# Patient Record
Sex: FEMALE | Race: WHITE | NOT HISPANIC OR LATINO | Employment: FULL TIME | URBAN - METROPOLITAN AREA
[De-identification: names, ages, dates, MRNs, and addresses within clinical notes are randomized per-mention and may not be internally consistent; named-entity substitution may affect disease eponyms.]

---
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
## 2022-05-05 PROCEDURE — U0004 COV-19 TEST NON-CDC HGH THRU: HCPCS | Performed by: FAMILY MEDICINE

## 2022-06-18 ENCOUNTER — APPOINTMENT (OUTPATIENT)
Dept: CT IMAGING | Facility: HOSPITAL | Age: 22
End: 2022-06-18

## 2022-06-18 ENCOUNTER — HOSPITAL ENCOUNTER (EMERGENCY)
Facility: HOSPITAL | Age: 22
Discharge: HOME | End: 2022-06-18
Attending: EMERGENCY MEDICINE | Admitting: EMERGENCY MEDICINE

## 2022-06-18 ENCOUNTER — HOSPITAL ENCOUNTER (EMERGENCY)
Facility: HOSPITAL | Age: 22
Discharge: LEFT BEFORE DISCHARGE | End: 2022-06-18

## 2022-06-18 VITALS
SYSTOLIC BLOOD PRESSURE: 113 MMHG | DIASTOLIC BLOOD PRESSURE: 72 MMHG | OXYGEN SATURATION: 100 % | RESPIRATION RATE: 18 BRPM | WEIGHT: 162.7 LBS | HEART RATE: 62 BPM | BODY MASS INDEX: 26.15 KG/M2 | TEMPERATURE: 97.7 F | HEIGHT: 66 IN

## 2022-06-18 VITALS
SYSTOLIC BLOOD PRESSURE: 123 MMHG | WEIGHT: 163.58 LBS | DIASTOLIC BLOOD PRESSURE: 84 MMHG | HEART RATE: 65 BPM | OXYGEN SATURATION: 100 % | HEIGHT: 66 IN | RESPIRATION RATE: 18 BRPM | BODY MASS INDEX: 26.29 KG/M2 | TEMPERATURE: 98 F

## 2022-06-18 DIAGNOSIS — R10.84 GENERALIZED ABDOMINAL PAIN: Primary | ICD-10-CM

## 2022-06-18 DIAGNOSIS — N30.00 ACUTE CYSTITIS WITHOUT HEMATURIA: ICD-10-CM

## 2022-06-18 LAB
ALBUMIN SERPL-MCNC: 4.8 G/DL (ref 3.5–5.2)
ALBUMIN/GLOB SERPL: 2 G/DL
ALP SERPL-CCNC: 56 U/L (ref 39–117)
ALT SERPL W P-5'-P-CCNC: 14 U/L (ref 1–33)
ANION GAP SERPL CALCULATED.3IONS-SCNC: 13 MMOL/L (ref 5–15)
AST SERPL-CCNC: 16 U/L (ref 1–32)
BACTERIA UR QL AUTO: ABNORMAL /HPF
BASOPHILS # BLD AUTO: 0.03 10*3/MM3 (ref 0–0.2)
BASOPHILS NFR BLD AUTO: 0.6 % (ref 0–1.5)
BILIRUB SERPL-MCNC: 0.4 MG/DL (ref 0–1.2)
BILIRUB UR QL STRIP: NEGATIVE
BUN SERPL-MCNC: 13 MG/DL (ref 6–20)
BUN/CREAT SERPL: 20.3 (ref 7–25)
CALCIUM SPEC-SCNC: 10 MG/DL (ref 8.6–10.5)
CHLORIDE SERPL-SCNC: 104 MMOL/L (ref 98–107)
CLARITY UR: ABNORMAL
CO2 SERPL-SCNC: 21 MMOL/L (ref 22–29)
COLOR UR: ABNORMAL
CREAT SERPL-MCNC: 0.64 MG/DL (ref 0.57–1)
DEPRECATED RDW RBC AUTO: 41.1 FL (ref 37–54)
EGFRCR SERPLBLD CKD-EPI 2021: 129.9 ML/MIN/1.73
EOSINOPHIL # BLD AUTO: 0.23 10*3/MM3 (ref 0–0.4)
EOSINOPHIL NFR BLD AUTO: 4.3 % (ref 0.3–6.2)
ERYTHROCYTE [DISTWIDTH] IN BLOOD BY AUTOMATED COUNT: 12.4 % (ref 12.3–15.4)
GLOBULIN UR ELPH-MCNC: 2.4 GM/DL
GLUCOSE SERPL-MCNC: 110 MG/DL (ref 65–99)
GLUCOSE UR STRIP-MCNC: NEGATIVE MG/DL
HCG INTACT+B SERPL-ACNC: <0.5 MIU/ML
HCT VFR BLD AUTO: 39.7 % (ref 34–46.6)
HGB BLD-MCNC: 13.4 G/DL (ref 12–15.9)
HGB UR QL STRIP.AUTO: NEGATIVE
HOLD SPECIMEN: NORMAL
HOLD SPECIMEN: NORMAL
HYALINE CASTS UR QL AUTO: ABNORMAL /LPF
IMM GRANULOCYTES # BLD AUTO: 0.01 10*3/MM3 (ref 0–0.05)
IMM GRANULOCYTES NFR BLD AUTO: 0.2 % (ref 0–0.5)
KETONES UR QL STRIP: ABNORMAL
LEUKOCYTE ESTERASE UR QL STRIP.AUTO: ABNORMAL
LIPASE SERPL-CCNC: 18 U/L (ref 13–60)
LYMPHOCYTES # BLD AUTO: 1.11 10*3/MM3 (ref 0.7–3.1)
LYMPHOCYTES NFR BLD AUTO: 20.5 % (ref 19.6–45.3)
MCH RBC QN AUTO: 30.2 PG (ref 26.6–33)
MCHC RBC AUTO-ENTMCNC: 33.8 G/DL (ref 31.5–35.7)
MCV RBC AUTO: 89.4 FL (ref 79–97)
MONOCYTES # BLD AUTO: 0.45 10*3/MM3 (ref 0.1–0.9)
MONOCYTES NFR BLD AUTO: 8.3 % (ref 5–12)
NEUTROPHILS NFR BLD AUTO: 3.58 10*3/MM3 (ref 1.7–7)
NEUTROPHILS NFR BLD AUTO: 66.1 % (ref 42.7–76)
NITRITE UR QL STRIP: NEGATIVE
NRBC BLD AUTO-RTO: 0 /100 WBC (ref 0–0.2)
PH UR STRIP.AUTO: 6.5 [PH] (ref 5–8)
PLATELET # BLD AUTO: 199 10*3/MM3 (ref 140–450)
PMV BLD AUTO: 10.3 FL (ref 6–12)
POTASSIUM SERPL-SCNC: 3.9 MMOL/L (ref 3.5–5.2)
PROT SERPL-MCNC: 7.2 G/DL (ref 6–8.5)
PROT UR QL STRIP: ABNORMAL
RBC # BLD AUTO: 4.44 10*6/MM3 (ref 3.77–5.28)
RBC # UR STRIP: ABNORMAL /HPF
REF LAB TEST METHOD: ABNORMAL
SODIUM SERPL-SCNC: 138 MMOL/L (ref 136–145)
SP GR UR STRIP: >1.03 (ref 1–1.03)
SQUAMOUS #/AREA URNS HPF: ABNORMAL /HPF
UROBILINOGEN UR QL STRIP: ABNORMAL
WBC # UR STRIP: ABNORMAL /HPF
WBC NRBC COR # BLD: 5.41 10*3/MM3 (ref 3.4–10.8)
WHOLE BLOOD HOLD COAG: NORMAL
WHOLE BLOOD HOLD SPECIMEN: NORMAL

## 2022-06-18 PROCEDURE — 25010000002 ONDANSETRON PER 1 MG: Performed by: NURSE PRACTITIONER

## 2022-06-18 PROCEDURE — 85025 COMPLETE CBC W/AUTO DIFF WBC: CPT | Performed by: EMERGENCY MEDICINE

## 2022-06-18 PROCEDURE — 99283 EMERGENCY DEPT VISIT LOW MDM: CPT

## 2022-06-18 PROCEDURE — 80053 COMPREHEN METABOLIC PANEL: CPT | Performed by: EMERGENCY MEDICINE

## 2022-06-18 PROCEDURE — 25010000002 KETOROLAC TROMETHAMINE PER 15 MG: Performed by: NURSE PRACTITIONER

## 2022-06-18 PROCEDURE — 99211 OFF/OP EST MAY X REQ PHY/QHP: CPT

## 2022-06-18 PROCEDURE — 96375 TX/PRO/DX INJ NEW DRUG ADDON: CPT

## 2022-06-18 PROCEDURE — 83690 ASSAY OF LIPASE: CPT | Performed by: EMERGENCY MEDICINE

## 2022-06-18 PROCEDURE — 25010000002 CEFTRIAXONE PER 250 MG: Performed by: NURSE PRACTITIONER

## 2022-06-18 PROCEDURE — 96361 HYDRATE IV INFUSION ADD-ON: CPT

## 2022-06-18 PROCEDURE — 96374 THER/PROPH/DIAG INJ IV PUSH: CPT

## 2022-06-18 PROCEDURE — 74176 CT ABD & PELVIS W/O CONTRAST: CPT

## 2022-06-18 PROCEDURE — 84702 CHORIONIC GONADOTROPIN TEST: CPT | Performed by: EMERGENCY MEDICINE

## 2022-06-18 PROCEDURE — 81001 URINALYSIS AUTO W/SCOPE: CPT | Performed by: EMERGENCY MEDICINE

## 2022-06-18 RX ORDER — ONDANSETRON 4 MG/1
4 TABLET, ORALLY DISINTEGRATING ORAL EVERY 8 HOURS PRN
Qty: 10 TABLET | Refills: 0 | Status: SHIPPED | OUTPATIENT
Start: 2022-06-18 | End: 2022-12-01 | Stop reason: SDUPTHER

## 2022-06-18 RX ORDER — ONDANSETRON 2 MG/ML
4 INJECTION INTRAMUSCULAR; INTRAVENOUS ONCE
Status: COMPLETED | OUTPATIENT
Start: 2022-06-18 | End: 2022-06-18

## 2022-06-18 RX ORDER — SODIUM CHLORIDE 0.9 % (FLUSH) 0.9 %
10 SYRINGE (ML) INJECTION AS NEEDED
Status: DISCONTINUED | OUTPATIENT
Start: 2022-06-18 | End: 2022-06-18 | Stop reason: HOSPADM

## 2022-06-18 RX ORDER — CEFTRIAXONE SODIUM 1 G/50ML
1 INJECTION, SOLUTION INTRAVENOUS ONCE
Status: COMPLETED | OUTPATIENT
Start: 2022-06-18 | End: 2022-06-18

## 2022-06-18 RX ORDER — DICYCLOMINE HCL 20 MG
20 TABLET ORAL EVERY 6 HOURS
Qty: 20 TABLET | Refills: 0 | Status: SHIPPED | OUTPATIENT
Start: 2022-06-18 | End: 2022-12-01

## 2022-06-18 RX ORDER — KETOROLAC TROMETHAMINE 30 MG/ML
30 INJECTION, SOLUTION INTRAMUSCULAR; INTRAVENOUS ONCE
Status: COMPLETED | OUTPATIENT
Start: 2022-06-18 | End: 2022-06-18

## 2022-06-18 RX ORDER — CEFDINIR 300 MG/1
300 CAPSULE ORAL 2 TIMES DAILY
Qty: 14 CAPSULE | Refills: 0 | Status: SHIPPED | OUTPATIENT
Start: 2022-06-18 | End: 2022-06-25

## 2022-06-18 RX ADMIN — KETOROLAC TROMETHAMINE 30 MG: 30 INJECTION, SOLUTION INTRAMUSCULAR; INTRAVENOUS at 05:32

## 2022-06-18 RX ADMIN — SODIUM CHLORIDE 1000 ML: 9 INJECTION, SOLUTION INTRAVENOUS at 05:30

## 2022-06-18 RX ADMIN — ONDANSETRON 4 MG: 2 INJECTION INTRAMUSCULAR; INTRAVENOUS at 05:30

## 2022-06-18 RX ADMIN — CEFTRIAXONE SODIUM 1 G: 1 INJECTION, SOLUTION INTRAVENOUS at 06:56

## 2022-06-18 NOTE — DISCHARGE INSTRUCTIONS
Ct scan was negative for any acute findings    Lab work was unremarkable other than urine showed some signs of infection.    Take medications as prescribed for treatment of UTI and for symptomatic treatment of abdominal pain    Follow-up with PCP as needed if no better.    Return for new or worsening symptoms

## 2022-06-18 NOTE — ED PROVIDER NOTES
Time: 06:54 EDT  Arrived by: EMS  Chief Complaint: Abdominal pain  History provided by: Patient  History is limited by: N/A    History of Present Illness:  Patient is a 22 y.o. year old female that presents to the emergency department with abdominal pain      History provided by:  Patient  Abdominal Pain  Pain location:  LUQ  Pain quality: aching and cramping    Pain radiates to:  LLQ and periumbilical region  Pain severity:  Moderate  Onset quality:  Sudden  Duration:  9 hours  Timing:  Constant  Progression:  Waxing and waning  Chronicity:  New  Context comment:  Patient complaining of left-sided abdominal pain that started suddenly around 8:30 PM  Relieved by:  Nothing  Worsened by:  Movement and palpation  Ineffective treatments:  NSAIDs  Associated symptoms: nausea and vomiting    Associated symptoms: no anorexia, no belching, no chest pain, no chills, no constipation, no cough, no diarrhea, no dysuria, no fatigue, no fever, no flatus, no hematemesis, no hematochezia, no hematuria, no melena, no shortness of breath, no sore throat, no vaginal bleeding and no vaginal discharge    Vomiting  The primary symptoms include abdominal pain, nausea and vomiting. Primary symptoms do not include fever, fatigue, diarrhea, melena, hematemesis, hematochezia or dysuria.   The illness is also significant for back pain. The illness does not include chills, anorexia or constipation.   Nausea  The primary symptoms include abdominal pain, nausea and vomiting. Primary symptoms do not include fever, fatigue, diarrhea, melena, hematemesis, hematochezia or dysuria.   The illness is also significant for back pain. The illness does not include chills, anorexia or constipation.       Similar Symptoms Previously: No  Recently seen: No      Patient Care Team  Primary Care Provider: None    Past Medical History:     Allergies   Allergen Reactions   • Contrast Dye Anaphylaxis     Past Medical History:   Diagnosis Date   • Cancer (HCC)    •  "Disease of thyroid gland      Past Surgical History:   Procedure Laterality Date   • APPENDECTOMY     • CHOLECYSTECTOMY     • TUMOR REMOVAL       History reviewed. No pertinent family history.    Home Medications:  Prior to Admission medications    Not on File        Social History:   PT  reports that she has been smoking cigarettes. She has never used smokeless tobacco. She reports current alcohol use. She reports previous drug use.    Record Review:  I have reviewed the patient's records in [facility].     Review of Systems  Review of Systems   Constitutional: Negative for chills, fatigue and fever.   HENT: Negative.  Negative for sore throat.    Eyes: Negative.    Respiratory: Negative.  Negative for cough and shortness of breath.    Cardiovascular: Negative.  Negative for chest pain.   Gastrointestinal: Positive for abdominal pain, nausea and vomiting. Negative for anorexia, constipation, diarrhea, flatus, hematemesis, hematochezia and melena.   Genitourinary: Positive for flank pain. Negative for dysuria, hematuria, vaginal bleeding and vaginal discharge.   Musculoskeletal: Positive for back pain.   Skin: Negative.    Neurological: Negative.    Hematological: Negative.    Psychiatric/Behavioral: Negative.         Physical Exam  /84   Pulse 51   Temp 98 °F (36.7 °C) (Oral)   Resp 18   Ht 167.6 cm (66\")   Wt 74.2 kg (163 lb 9.3 oz)   LMP 06/13/2022   SpO2 98%   BMI 26.40 kg/m²     Physical Exam  Vitals and nursing note reviewed.   Constitutional:       General: She is not in acute distress.     Appearance: Normal appearance. She is not toxic-appearing.   HENT:      Head: Normocephalic and atraumatic.      Mouth/Throat:      Mouth: Mucous membranes are moist.   Eyes:      General: No scleral icterus.  Cardiovascular:      Rate and Rhythm: Normal rate and regular rhythm.      Pulses: Normal pulses.      Heart sounds: Normal heart sounds.   Pulmonary:      Effort: Pulmonary effort is normal. No respiratory " "distress.      Breath sounds: Normal breath sounds.   Abdominal:      General: Abdomen is flat.      Palpations: Abdomen is soft.      Tenderness: There is abdominal tenderness in the periumbilical area, left upper quadrant and left lower quadrant. There is left CVA tenderness. There is no right CVA tenderness.      Hernia: No hernia is present.   Musculoskeletal:         General: Normal range of motion.      Cervical back: Normal range of motion and neck supple.   Skin:     General: Skin is warm and dry.   Neurological:      Mental Status: She is alert and oriented to person, place, and time. Mental status is at baseline.   Psychiatric:         Mood and Affect: Mood normal.         Behavior: Behavior normal.                  ED Course  /84   Pulse 51   Temp 98 °F (36.7 °C) (Oral)   Resp 18   Ht 167.6 cm (66\")   Wt 74.2 kg (163 lb 9.3 oz)   LMP 06/13/2022   SpO2 98%   BMI 26.40 kg/m²   Results for orders placed or performed during the hospital encounter of 06/18/22   Comprehensive Metabolic Panel    Specimen: Blood   Result Value Ref Range    Glucose 110 (H) 65 - 99 mg/dL    BUN 13 6 - 20 mg/dL    Creatinine 0.64 0.57 - 1.00 mg/dL    Sodium 138 136 - 145 mmol/L    Potassium 3.9 3.5 - 5.2 mmol/L    Chloride 104 98 - 107 mmol/L    CO2 21.0 (L) 22.0 - 29.0 mmol/L    Calcium 10.0 8.6 - 10.5 mg/dL    Total Protein 7.2 6.0 - 8.5 g/dL    Albumin 4.80 3.50 - 5.20 g/dL    ALT (SGPT) 14 1 - 33 U/L    AST (SGOT) 16 1 - 32 U/L    Alkaline Phosphatase 56 39 - 117 U/L    Total Bilirubin 0.4 0.0 - 1.2 mg/dL    Globulin 2.4 gm/dL    A/G Ratio 2.0 g/dL    BUN/Creatinine Ratio 20.3 7.0 - 25.0    Anion Gap 13.0 5.0 - 15.0 mmol/L    eGFR 129.9 >60.0 mL/min/1.73   Lipase    Specimen: Blood   Result Value Ref Range    Lipase 18 13 - 60 U/L   Urinalysis With Microscopic If Indicated (No Culture) - Urine, Clean Catch    Specimen: Urine, Clean Catch   Result Value Ref Range    Color, UA Dark Yellow (A) Yellow, Straw    " Appearance, UA Cloudy (A) Clear    pH, UA 6.5 5.0 - 8.0    Specific Gravity, UA >1.030 (H) 1.005 - 1.030    Glucose, UA Negative Negative    Ketones, UA 15 mg/dL (1+) (A) Negative    Bilirubin, UA Negative Negative    Blood, UA Negative Negative    Protein, UA Trace (A) Negative    Leuk Esterase, UA Trace (A) Negative    Nitrite, UA Negative Negative    Urobilinogen, UA 1.0 E.U./dL 0.2 - 1.0 E.U./dL   hCG, Quantitative, Pregnancy    Specimen: Blood   Result Value Ref Range    HCG Quantitative <0.50 mIU/mL   CBC Auto Differential    Specimen: Blood   Result Value Ref Range    WBC 5.41 3.40 - 10.80 10*3/mm3    RBC 4.44 3.77 - 5.28 10*6/mm3    Hemoglobin 13.4 12.0 - 15.9 g/dL    Hematocrit 39.7 34.0 - 46.6 %    MCV 89.4 79.0 - 97.0 fL    MCH 30.2 26.6 - 33.0 pg    MCHC 33.8 31.5 - 35.7 g/dL    RDW 12.4 12.3 - 15.4 %    RDW-SD 41.1 37.0 - 54.0 fl    MPV 10.3 6.0 - 12.0 fL    Platelets 199 140 - 450 10*3/mm3    Neutrophil % 66.1 42.7 - 76.0 %    Lymphocyte % 20.5 19.6 - 45.3 %    Monocyte % 8.3 5.0 - 12.0 %    Eosinophil % 4.3 0.3 - 6.2 %    Basophil % 0.6 0.0 - 1.5 %    Immature Grans % 0.2 0.0 - 0.5 %    Neutrophils, Absolute 3.58 1.70 - 7.00 10*3/mm3    Lymphocytes, Absolute 1.11 0.70 - 3.10 10*3/mm3    Monocytes, Absolute 0.45 0.10 - 0.90 10*3/mm3    Eosinophils, Absolute 0.23 0.00 - 0.40 10*3/mm3    Basophils, Absolute 0.03 0.00 - 0.20 10*3/mm3    Immature Grans, Absolute 0.01 0.00 - 0.05 10*3/mm3    nRBC 0.0 0.0 - 0.2 /100 WBC   Urinalysis, Microscopic Only - Urine, Clean Catch    Specimen: Urine, Clean Catch   Result Value Ref Range    RBC, UA 3-5 (A) None Seen /HPF    WBC, UA 6-12 (A) None Seen /HPF    Bacteria, UA 2+ (A) None Seen /HPF    Squamous Epithelial Cells, UA 7-12 (A) None Seen, 0-2 /HPF    Hyaline Casts, UA 7-12 None Seen /LPF    Methodology Automated Microscopy    Green Top (Gel)   Result Value Ref Range    Extra Tube Hold for add-ons.    Lavender Top   Result Value Ref Range    Extra Tube hold for  add-on    Gold Top - SST   Result Value Ref Range    Extra Tube Hold for add-ons.    Light Blue Top   Result Value Ref Range    Extra Tube Hold for add-ons.      Medications   sodium chloride 0.9 % flush 10 mL (has no administration in time range)   cefTRIAXone (ROCEPHIN) IVPB 1 g (has no administration in time range)   ketorolac (TORADOL) injection 30 mg (30 mg Intravenous Given 6/18/22 0532)   ondansetron (ZOFRAN) injection 4 mg (4 mg Intravenous Given 6/18/22 0530)   sodium chloride 0.9 % bolus 1,000 mL (1,000 mL Intravenous New Bag 6/18/22 0530)     CT Abdomen Pelvis Without Contrast    Result Date: 6/18/2022  Narrative: PROCEDURE: CT ABDOMEN PELVIS WO CONTRAST  COMPARISON: None  INDICATIONS: LEFT FLANK PAIN TODAY  TECHNIQUE: CT images were created without intravenous contrast.   PROTOCOL:   Standard imaging protocol performed    RADIATION:   DLP: 390.6 mGy*cm   Automated exposure control was utilized to minimize radiation dose.  FINDINGS:  The lung bases are clear.  The unenhanced liver, adrenal glands, kidneys, spleen, and pancreas are unremarkable.  The gallbladder is surgically absent.  The stomach appears normal.  The small bowel appears normal in caliber and configuration.  The colon appears normal.  The appendix is not well visualized.  There is no ascites or loculated collection.  No abnormally enlarged lymph nodes are identified.  The rectum, uterus, and urinary bladder are unremarkable.  No aggressive osseous lesions are identified.      Impression:  No acute process identified within abdomen/pelvis.     FRANKLIN OROZCO MD       Electronically Signed and Approved By: FRANKLIN OROZCO MD on 6/18/2022 at 6:36                 Medical Decision Making:                     MDM  Number of Diagnoses or Management Options  Acute cystitis without hematuria  Generalized abdominal pain  Diagnosis management comments: The patient is resting comfortably and feels better, is alert and in no distress. Repeat  examination is unremarkable and benign; in particular, there's no discomfort at McBurney's point and there is no pulsatile mass. The history, exam, diagnostic testing, and current condition does not suggest acute appendicitis, bowel obstruction, acute cholecystitis, bowel perforation, major gastrointestinal bleeding, severe diverticulitis, abdominal aortic aneurysm, mesenteric ischemia, volvulus, sepsis, or other significant pathology that warrants further testing, continued ED treatment, admission, for surgical evaluation at this point. The vital signs have been stable. The patient does not have uncontrollable pain, intractable vomiting, or other significant symptoms. The patient's condition is stable and appropriate for discharge from the emergency department.         Amount and/or Complexity of Data Reviewed  Clinical lab tests: reviewed and ordered  Tests in the radiology section of CPT®: reviewed and ordered  Tests in the medicine section of CPT®: reviewed and ordered    Risk of Complications, Morbidity, and/or Mortality  Presenting problems: moderate  Diagnostic procedures: moderate  Management options: low    Patient Progress  Patient progress: stable       Final diagnoses:   Generalized abdominal pain   Acute cystitis without hematuria        Disposition:  ED Disposition     ED Disposition   Discharge    Condition   Stable    Comment   --              Sabra Fitzpatrick, ANKITA  06/18/22 0654

## 2022-08-02 PROCEDURE — U0004 COV-19 TEST NON-CDC HGH THRU: HCPCS | Performed by: EMERGENCY MEDICINE

## 2022-08-03 ENCOUNTER — TELEPHONE (OUTPATIENT)
Dept: URGENT CARE | Facility: CLINIC | Age: 22
End: 2022-08-03

## 2022-09-01 ENCOUNTER — OFFICE VISIT (OUTPATIENT)
Dept: INTERNAL MEDICINE | Facility: CLINIC | Age: 22
End: 2022-09-01

## 2022-09-01 VITALS
WEIGHT: 166 LBS | DIASTOLIC BLOOD PRESSURE: 63 MMHG | HEIGHT: 66 IN | BODY MASS INDEX: 26.68 KG/M2 | HEART RATE: 89 BPM | OXYGEN SATURATION: 99 % | SYSTOLIC BLOOD PRESSURE: 118 MMHG

## 2022-09-01 DIAGNOSIS — N92.6 IRREGULAR MENSES: ICD-10-CM

## 2022-09-01 DIAGNOSIS — C85.90 LYMPHOMA, UNSPECIFIED BODY REGION, UNSPECIFIED LYMPHOMA TYPE: ICD-10-CM

## 2022-09-01 DIAGNOSIS — E03.9 HYPOTHYROIDISM, UNSPECIFIED TYPE: ICD-10-CM

## 2022-09-01 DIAGNOSIS — N97.9 FEMALE FERTILITY PROBLEM: ICD-10-CM

## 2022-09-01 DIAGNOSIS — Z76.89 ESTABLISHING CARE WITH NEW DOCTOR, ENCOUNTER FOR: Primary | ICD-10-CM

## 2022-09-01 DIAGNOSIS — D48.1 MYOFIBROMATOSIS: ICD-10-CM

## 2022-09-01 DIAGNOSIS — Z13.220 LIPID SCREENING: ICD-10-CM

## 2022-09-01 PROBLEM — D48.19: Status: ACTIVE | Noted: 2022-09-01

## 2022-09-01 LAB
ALBUMIN SERPL-MCNC: 4.4 G/DL (ref 3.5–5.2)
ALBUMIN/GLOB SERPL: 2.6 G/DL
ALP SERPL-CCNC: 48 U/L (ref 39–117)
ALT SERPL W P-5'-P-CCNC: 14 U/L (ref 1–33)
ANION GAP SERPL CALCULATED.3IONS-SCNC: 7 MMOL/L (ref 5–15)
AST SERPL-CCNC: 15 U/L (ref 1–32)
BASOPHILS # BLD AUTO: 0.03 10*3/MM3 (ref 0–0.2)
BASOPHILS NFR BLD AUTO: 0.8 % (ref 0–1.5)
BILIRUB SERPL-MCNC: 0.2 MG/DL (ref 0–1.2)
BUN SERPL-MCNC: 9 MG/DL (ref 6–20)
BUN/CREAT SERPL: 13.2 (ref 7–25)
CALCIUM SPEC-SCNC: 9.2 MG/DL (ref 8.6–10.5)
CHLORIDE SERPL-SCNC: 106 MMOL/L (ref 98–107)
CHOLEST SERPL-MCNC: 118 MG/DL (ref 0–200)
CO2 SERPL-SCNC: 25 MMOL/L (ref 22–29)
CREAT SERPL-MCNC: 0.68 MG/DL (ref 0.57–1)
DEPRECATED RDW RBC AUTO: 37.3 FL (ref 37–54)
EGFRCR SERPLBLD CKD-EPI 2021: 128 ML/MIN/1.73
EOSINOPHIL # BLD AUTO: 0.4 10*3/MM3 (ref 0–0.4)
EOSINOPHIL NFR BLD AUTO: 10.9 % (ref 0.3–6.2)
ERYTHROCYTE [DISTWIDTH] IN BLOOD BY AUTOMATED COUNT: 11.5 % (ref 12.3–15.4)
GLOBULIN UR ELPH-MCNC: 1.7 GM/DL
GLUCOSE SERPL-MCNC: 89 MG/DL (ref 65–99)
HCT VFR BLD AUTO: 36.9 % (ref 34–46.6)
HDLC SERPL-MCNC: 64 MG/DL (ref 40–60)
HGB BLD-MCNC: 12.3 G/DL (ref 12–15.9)
IMM GRANULOCYTES # BLD AUTO: 0.01 10*3/MM3 (ref 0–0.05)
IMM GRANULOCYTES NFR BLD AUTO: 0.3 % (ref 0–0.5)
LDLC SERPL CALC-MCNC: 43 MG/DL (ref 0–100)
LDLC/HDLC SERPL: 0.7 {RATIO}
LYMPHOCYTES # BLD AUTO: 1.16 10*3/MM3 (ref 0.7–3.1)
LYMPHOCYTES NFR BLD AUTO: 31.5 % (ref 19.6–45.3)
MCH RBC QN AUTO: 29.6 PG (ref 26.6–33)
MCHC RBC AUTO-ENTMCNC: 33.3 G/DL (ref 31.5–35.7)
MCV RBC AUTO: 88.9 FL (ref 79–97)
MONOCYTES # BLD AUTO: 0.29 10*3/MM3 (ref 0.1–0.9)
MONOCYTES NFR BLD AUTO: 7.9 % (ref 5–12)
NEUTROPHILS NFR BLD AUTO: 1.79 10*3/MM3 (ref 1.7–7)
NEUTROPHILS NFR BLD AUTO: 48.6 % (ref 42.7–76)
NRBC BLD AUTO-RTO: 0 /100 WBC (ref 0–0.2)
PLATELET # BLD AUTO: 168 10*3/MM3 (ref 140–450)
PMV BLD AUTO: 11.5 FL (ref 6–12)
POTASSIUM SERPL-SCNC: 4 MMOL/L (ref 3.5–5.2)
PROT SERPL-MCNC: 6.1 G/DL (ref 6–8.5)
RBC # BLD AUTO: 4.15 10*6/MM3 (ref 3.77–5.28)
SODIUM SERPL-SCNC: 138 MMOL/L (ref 136–145)
T3FREE SERPL-MCNC: 3.39 PG/ML (ref 2–4.4)
T4 FREE SERPL-MCNC: 1.07 NG/DL (ref 0.93–1.7)
TRIGL SERPL-MCNC: 45 MG/DL (ref 0–150)
TSH SERPL DL<=0.05 MIU/L-ACNC: 1.28 UIU/ML (ref 0.27–4.2)
VLDLC SERPL-MCNC: 11 MG/DL (ref 5–40)
WBC NRBC COR # BLD: 3.68 10*3/MM3 (ref 3.4–10.8)

## 2022-09-01 PROCEDURE — 85025 COMPLETE CBC W/AUTO DIFF WBC: CPT | Performed by: NURSE PRACTITIONER

## 2022-09-01 PROCEDURE — 99204 OFFICE O/P NEW MOD 45 MIN: CPT | Performed by: NURSE PRACTITIONER

## 2022-09-01 PROCEDURE — 84439 ASSAY OF FREE THYROXINE: CPT | Performed by: NURSE PRACTITIONER

## 2022-09-01 PROCEDURE — 84443 ASSAY THYROID STIM HORMONE: CPT | Performed by: NURSE PRACTITIONER

## 2022-09-01 PROCEDURE — 80053 COMPREHEN METABOLIC PANEL: CPT | Performed by: NURSE PRACTITIONER

## 2022-09-01 PROCEDURE — 84481 FREE ASSAY (FT-3): CPT | Performed by: NURSE PRACTITIONER

## 2022-09-01 PROCEDURE — 80061 LIPID PANEL: CPT | Performed by: NURSE PRACTITIONER

## 2022-09-01 NOTE — ASSESSMENT & PLAN NOTE
We will go ahead and refer to OB/GYN, they will be able to discuss her menstrual cycles as well as fertility.

## 2022-09-01 NOTE — PROGRESS NOTES
"Chief Complaint  fertility issues     Subjective        Laina Jones presents to [facility] [place] for Establishment of care, annual physical exam, to discuss fertility issues and irregular menses, and to discuss other chronic problems.    Patient is here to establish care, she reports that over the last several years she has not had any regular care.  Patient has a significant history for mild fibromatosis and lymphoma, she reports that she was diagnosed as in a , she did undergo multiple treatments and surgeries as a child, she reports she has been in remission since age 13, but she admits that she has not had any follow-up since that time.  She was seen at what was known as  at the time, now at [facility].  Patient has attempted to request her medical records, however she has not been able to.    Patient is admit to having issues with her thyroid over the years, she was diagnosed as hypothyroidism several years ago, she has been on and off medication, she has not had any lab work checked probably since 2019.    She is here today primarily because of her concern for infertility, she was told as a child that she would probably never be able to conceive children.  Her and her boyfriend have been trying to conceive for the last year, and has been unsuccessful.  Patient also reports that she has irregular menstrual cycles, this is been for her entire life.  Patient did try to establish with OB/GYN, but they said that she needed a referral.  She has needed 1 today.    Otherwise, patient does not have any significant concerns or complaints today.       Objective   Vital Signs:   /63   Pulse 89   Ht 167.6 cm (66\")   Wt 75.3 kg (166 lb)   SpO2 99%   BMI 26.79 kg/m²     Physical Exam  Vitals and nursing note reviewed.   Constitutional:       Appearance: Normal appearance. She is normal weight.   HENT:      Head: Normocephalic and atraumatic.    "   Right Ear: Tympanic membrane, ear canal and external ear normal.      Left Ear: Tympanic membrane, ear canal and external ear normal.      Nose: Nose normal.      Mouth/Throat:      Mouth: Mucous membranes are moist.      Pharynx: Oropharynx is clear.   Eyes:      Conjunctiva/sclera: Conjunctivae normal.      Pupils: Pupils are equal, round, and reactive to light.   Cardiovascular:      Rate and Rhythm: Normal rate and regular rhythm.   Pulmonary:      Effort: Pulmonary effort is normal.      Breath sounds: Normal breath sounds.   Neurological:      Mental Status: She is alert.   Psychiatric:         Mood and Affect: Mood normal.         Thought Content: Thought content normal.        Result Review :  {The following data was reviewed by ANKITA Downing on 09/01/22                Diagnoses and all orders for this visit:    1. Establishing care with new doctor, encounter for (Primary)    2. Myofibromatosis  Assessment & Plan:  We will work on obtaining records, go ahead and refer to heme-onc for further evaluation management.    Orders:  -     Comprehensive Metabolic Panel  -     CBC & Differential  -     Ambulatory Referral to Hematology / Oncology    3. Lymphoma, unspecified body region, unspecified lymphoma type (HCC)  Assessment & Plan:  We will work on obtaining records today, will go ahead and refer to heme-onc for further evaluation and management    Orders:  -     Ambulatory Referral to Hematology / Oncology    4. Irregular menses  Assessment & Plan:  We will go ahead and refer to OB/GYN, they will be able to discuss her menstrual cycles as well as fertility.    Orders:  -     Ambulatory Referral to Obstetrics / Gynecology    5. Female fertility problem  -     Ambulatory Referral to Obstetrics / Gynecology    6. Hypothyroidism, unspecified type  Assessment & Plan:  We will get labs today, we will follow-up based on results.    Orders:  -     TSH  -     T4, Free  -     T3, Free    7. Lipid screening  -      Lipid Panel        Follow Up   Return in about 3 months (around 12/1/2022) for Recheck, Annual physical.  Patient was given instructions and counseling regarding her condition or for health maintenance advice. Please see specific information pulled into the AVS if appropriate.     Manish Wilson, APRN  9/1/2022  This note was electronically signed.

## 2022-09-01 NOTE — ASSESSMENT & PLAN NOTE
We will work on obtaining records today, will go ahead and refer to heme-onc for further evaluation and management

## 2022-09-01 NOTE — ASSESSMENT & PLAN NOTE
We will work on obtaining records, go ahead and refer to heme-onc for further evaluation management.

## 2022-09-26 ENCOUNTER — CONSULT (OUTPATIENT)
Dept: ONCOLOGY | Facility: HOSPITAL | Age: 22
End: 2022-09-26

## 2022-09-26 VITALS
TEMPERATURE: 98.6 F | OXYGEN SATURATION: 98 % | SYSTOLIC BLOOD PRESSURE: 107 MMHG | HEIGHT: 67 IN | RESPIRATION RATE: 18 BRPM | WEIGHT: 165.34 LBS | BODY MASS INDEX: 25.95 KG/M2 | DIASTOLIC BLOOD PRESSURE: 61 MMHG | HEART RATE: 58 BPM

## 2022-09-26 DIAGNOSIS — R51.9 CHRONIC HEADACHE WITHOUT NEW FEATURES: ICD-10-CM

## 2022-09-26 DIAGNOSIS — Q89.8 INFANTILE MYOFIBROMATOSIS: Primary | ICD-10-CM

## 2022-09-26 DIAGNOSIS — R19.7 DIARRHEA, UNSPECIFIED TYPE: ICD-10-CM

## 2022-09-26 DIAGNOSIS — G89.29 CHRONIC HEADACHE WITHOUT NEW FEATURES: ICD-10-CM

## 2022-09-26 PROCEDURE — 99204 OFFICE O/P NEW MOD 45 MIN: CPT | Performed by: INTERNAL MEDICINE

## 2022-09-26 PROCEDURE — G0463 HOSPITAL OUTPT CLINIC VISIT: HCPCS

## 2022-09-26 RX ORDER — SUMATRIPTAN 100 MG/1
100 TABLET, FILM COATED ORAL
COMMUNITY
End: 2022-12-01

## 2022-09-26 RX ORDER — VILAZODONE HYDROCHLORIDE 10 MG/1
TABLET ORAL
COMMUNITY
End: 2022-12-01

## 2022-09-26 RX ORDER — ONDANSETRON 4 MG/1
4 TABLET, FILM COATED ORAL EVERY 8 HOURS PRN
Qty: 9 TABLET | Refills: 3 | Status: SHIPPED | OUTPATIENT
Start: 2022-09-26 | End: 2022-09-29

## 2022-09-26 RX ORDER — NORGESTIMATE AND ETHINYL ESTRADIOL 7DAYSX3 28
KIT ORAL
COMMUNITY
End: 2022-12-01

## 2022-09-26 NOTE — PROGRESS NOTES
Chief Complaint  Infantile Myofibromatosis, infertility    Manish Wilson APRN Newton, Aaron, APRN Subjective Taylor Robert presents to [facility] for IM, infertility    Ms. 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  It was treated at [facility] with chemotherapy and multiple surgeries.  The mother states that the only area that was unable to be removed was 1 at the back of her skull.  She presents because of infertility issues.  She has not been able to get pregnant however she is having periods.  She has never had a missed period.  She does state that they are irregular at times.  She has a history of chronic headaches at the back of her school that she states or pulsatile.  She states they do not respond to any over-the-counter medications.  She also has a history of diarrhea.  As well as abdominal pain that waxes and wanes.  She also has frequent nausea that is worse in the morning. She states certain days she has more GI issues than others. She also admits to weight loss in last 4 months that has been unintentional and has occurred despite eating. She has daily fatigue as well as shortness of breath with exertion. She does vape daily. She has mood swings as well.       Oncology History  Found at 3 weeks old to have infantile myofibromatosis. Received 52 weeks of chemotherapy. Multiple surgeries. Records unable to be obtained per patient due to being paper chart and possibly lost at [facility].    No recent imaging. No other surgeries for cancer since this time.     Review of Systems   Constitutional: Positive for fatigue.   Respiratory: Positive for shortness of breath (PT states she gets out of breath easily ).    Neurological: Positive for headache (Intense headaches daily ).   All other systems reviewed and are negative.    Current  Outpatient Medications on File Prior to Visit   Medication Sig Dispense Refill   • acetaminophen (TYLENOL) 500 MG tablet Take 1 tablet by mouth Every 6 (Six) Hours As Needed for Mild Pain . 30 tablet 0   • Benzocaine-Menthol (Cepacol) 15-2.3 MG lozenge Dissolve 1 lozenge in the mouth 4 (Four) Times a Day. 16 lozenge 0   • dicyclomine (BENTYL) 20 MG tablet Take 1 tablet by mouth Every 6 (Six) Hours. (Patient not taking: No sig reported) 20 tablet 0   • hydrOXYzine (ATARAX) 25 MG tablet Take 1 tablet by mouth 3 (Three) Times a Day As Needed for Itching. 30 tablet 0   • norgestimate-ethinyl estradiol (ORTHO TRI-CYCLEN,TRINESSA) 0.18/0.215/0.25 MG-35 MCG per tablet Take  by mouth.     • ondansetron ODT (ZOFRAN-ODT) 4 MG disintegrating tablet Place 1 tablet on the tongue Every 8 (Eight) Hours As Needed for Nausea or Vomiting. 10 tablet 0   • predniSONE (DELTASONE) 20 MG tablet Take 2 tablets by mouth Daily. 10 tablet 0   • SUMAtriptan (IMITREX) 100 MG tablet Take 100 mg by mouth.     • triamcinolone (KENALOG) 0.5 % cream Apply 1 application topically to the appropriate area as directed 2 (Two) Times a Day. 60 g 0   • vilazodone (VIIBRYD) 10 MG tablet tablet Take  by mouth.       No current facility-administered medications on file prior to visit.       Allergies   Allergen Reactions   • Contrast Dye Anaphylaxis   • Latex Anaphylaxis   • Gadolinium Derivatives Unknown - Low Severity     Past Medical History:   Diagnosis Date   • Cancer (HCC)    • Disease of thyroid gland      Past Surgical History:   Procedure Laterality Date   • APPENDECTOMY     • CHOLECYSTECTOMY     • TUMOR REMOVAL       Social History     Socioeconomic History   • Marital status: Single   Tobacco Use   • Smoking status: Former Smoker     Types: Cigarettes   • Smokeless tobacco: Never Used   Vaping Use   • Vaping Use: Every day   • Substances: Nicotine, Flavoring   Substance and Sexual Activity   • Alcohol use: Yes     Comment: occ   • Drug use: Not  "Currently   • Sexual activity: Defer     No family history on file.    Objective   Physical Exam   Normal appearing female in NAD, in RA  Normal respiratory effort  No rashes on exposed skin  AAOx4. No gross neurologic deficit. Speech intact      Vitals:    09/26/22 1305   BP: 107/61   Pulse: 58   Resp: 18   Temp: 98.6 °F (37 °C)   SpO2: 98%   Weight: 75 kg (165 lb 5.5 oz)   Height: 169 cm (66.54\")   PainSc: 0-No pain     ECOG score: 0         PHQ-9 Total Score:                      Result Review :   The following data was reviewed by: Kaushik Parson MD on 09/26/2022:  Lab Results   Component Value Date    HGB 12.3 09/01/2022    HCT 36.9 09/01/2022    MCV 88.9 09/01/2022     09/01/2022    WBC 3.68 09/01/2022    NEUTROABS 1.79 09/01/2022    LYMPHSABS 1.16 09/01/2022    MONOSABS 0.29 09/01/2022    EOSABS 0.40 09/01/2022    BASOSABS 0.03 09/01/2022     Lab Results   Component Value Date    GLUCOSE 89 09/01/2022    BUN 9 09/01/2022    CREATININE 0.68 09/01/2022     09/01/2022    K 4.0 09/01/2022     09/01/2022    CO2 25.0 09/01/2022    CALCIUM 9.2 09/01/2022    PROTEINTOT 6.1 09/01/2022    ALBUMIN 4.40 09/01/2022    BILITOT 0.2 09/01/2022    ALKPHOS 48 09/01/2022    AST 15 09/01/2022    ALT 14 09/01/2022     Lab Results   Component Value Date    FREET4 1.07 09/01/2022    TSH 1.280 09/01/2022                Assessment and Plan    Diagnoses and all orders for this visit:    1. Infantile myofibromatosis (Primary)  -     Ambulatory Referral to ONC Social Work    2. Diarrhea, unspecified type  -     Ambulatory Referral to Gastroenterology    3. Chronic headache without new features  -     Ambulatory Referral to Neurology  -     CT Head Without Contrast; Future    Other orders  -     ondansetron (ZOFRAN) 4 MG tablet; Take 1 tablet by mouth Every 8 (Eight) Hours As Needed for Nausea or Vomiting for up to 3 days.  Dispense: 9 tablet; Refill: 3        Patient has KRYSTAL regarding her infantile " myofibromatosis that was treated when patient was less than 3 year old. Treatment included 52 weeks of chemotherapy and surgeries per patient's mother. Difficulty in obtaining records per patient due to paper charts being used previously. Recommend annual follow up.     Concern of infertility from prior chemotherapy was brought up, however patient is having monthly periods. Agree with Ob/Gyn evaluation.     Patient has had frequent, daily, and intense headaches that are non-responsive to over the counter therapy. She has a history of a tumor at the back of her skull that was unable to be removed due to location when she was an infant. Due to her history, will obtain a CT head without contrast. Will also refer to neurology regarding management of her headaches going forward.     Diarrhea and nausea certainly could be due to IBS, but other causes have not been evaluation. She has also had significant weight loss in last 4 months. For these reasons, have referred to GI for evaluation and consideration of endoscopy. Have e-prescribed zofran 4 mg every 8 hours for as needed use.     Tobacco use: Patient is using vapes. Recommend cessation.         Patient Follow Up: 1 year  Patient was given instructions and counseling regarding her condition or for health maintenance advice. Please see specific information pulled into the AVS if appropriate.

## 2022-09-30 ENCOUNTER — PATIENT ROUNDING (BHMG ONLY) (OUTPATIENT)
Dept: ONCOLOGY | Facility: HOSPITAL | Age: 22
End: 2022-09-30

## 2022-09-30 NOTE — PROGRESS NOTES
September 30, 2022    Hello, may I speak with Laina Jones?    My name is Sally.    I am  with [facility]  [address] [place] [address] [address]  [place] [address] [phone].    Before we get started may I verify your date of birth? 2000    I am calling to officially welcome you to our practice and ask about your recent visit. Is this a good time to talk? NO- Left Voicemail    Tell me about your visit with us. What things went well?       We're always looking for ways to make our patients' experiences even better. Do you have recommendations on ways we may improve?  NO- Left Voicemail    Overall were you satisfied with your first visit to our practice? NO- Left Voicemail       I appreciate you taking the time to speak with me today. Is there anything else I can do for you? NO-  Left Voicemail      Thank you, and have a great day.

## 2022-10-10 ENCOUNTER — TELEPHONE (OUTPATIENT)
Dept: ONCOLOGY | Facility: HOSPITAL | Age: 22
End: 2022-10-10

## 2022-10-10 NOTE — TELEPHONE ENCOUNTER
OSW attempted to contact patient to review her distress score. OSW left a message with a call back number.

## 2022-10-11 ENCOUNTER — TELEPHONE (OUTPATIENT)
Dept: ONCOLOGY | Facility: HOSPITAL | Age: 22
End: 2022-10-11

## 2022-10-24 ENCOUNTER — HOSPITAL ENCOUNTER (OUTPATIENT)
Dept: CT IMAGING | Facility: HOSPITAL | Age: 22
Discharge: HOME | End: 2022-10-24
Admitting: INTERNAL MEDICINE

## 2022-10-24 DIAGNOSIS — R51.9 CHRONIC HEADACHE WITHOUT NEW FEATURES: ICD-10-CM

## 2022-10-24 DIAGNOSIS — G89.29 CHRONIC HEADACHE WITHOUT NEW FEATURES: ICD-10-CM

## 2022-10-24 PROCEDURE — 70450 CT HEAD/BRAIN W/O DYE: CPT

## 2022-11-01 ENCOUNTER — TELEPHONE (OUTPATIENT)
Dept: OBSTETRICS AND GYNECOLOGY | Facility: CLINIC | Age: 22
End: 2022-11-01

## 2022-11-03 ENCOUNTER — TELEPHONE (OUTPATIENT)
Dept: GASTROENTEROLOGY | Facility: CLINIC | Age: 22
End: 2022-11-03

## 2022-11-03 NOTE — TELEPHONE ENCOUNTER
Patient is scheduled for a new patient appointment with Mario Ewing on 12/06/2022. However, this provider is leaving the practice, left a detailed message that we need to reschedule this appointment and gave the option of rescheduling with Nicolasa Bliss or calling on of the other providers offices. Will mail letter.

## 2022-12-01 ENCOUNTER — OFFICE VISIT (OUTPATIENT)
Dept: INTERNAL MEDICINE | Facility: CLINIC | Age: 22
End: 2022-12-01

## 2022-12-01 ENCOUNTER — TELEPHONE (OUTPATIENT)
Dept: INTERNAL MEDICINE | Facility: CLINIC | Age: 22
End: 2022-12-01

## 2022-12-01 VITALS
OXYGEN SATURATION: 96 % | HEART RATE: 73 BPM | DIASTOLIC BLOOD PRESSURE: 60 MMHG | SYSTOLIC BLOOD PRESSURE: 110 MMHG | BODY MASS INDEX: 27.51 KG/M2 | TEMPERATURE: 98.1 F | WEIGHT: 173.2 LBS

## 2022-12-01 DIAGNOSIS — N97.9 FEMALE FERTILITY PROBLEM: ICD-10-CM

## 2022-12-01 DIAGNOSIS — C85.90 LYMPHOMA, UNSPECIFIED BODY REGION, UNSPECIFIED LYMPHOMA TYPE: ICD-10-CM

## 2022-12-01 DIAGNOSIS — Z32.00 POSSIBLE PREGNANCY: ICD-10-CM

## 2022-12-01 DIAGNOSIS — E03.9 HYPOTHYROIDISM, UNSPECIFIED TYPE: ICD-10-CM

## 2022-12-01 DIAGNOSIS — R11.2 NAUSEA AND VOMITING, UNSPECIFIED VOMITING TYPE: Primary | ICD-10-CM

## 2022-12-01 LAB
B-HCG UR QL: NEGATIVE
EXPIRATION DATE: NORMAL
INTERNAL NEGATIVE CONTROL: NORMAL
INTERNAL POSITIVE CONTROL: NORMAL
Lab: NORMAL

## 2022-12-01 PROCEDURE — 81025 URINE PREGNANCY TEST: CPT | Performed by: NURSE PRACTITIONER

## 2022-12-01 PROCEDURE — 99214 OFFICE O/P EST MOD 30 MIN: CPT | Performed by: NURSE PRACTITIONER

## 2022-12-01 RX ORDER — ONDANSETRON 4 MG/1
4 TABLET, ORALLY DISINTEGRATING ORAL EVERY 8 HOURS PRN
Qty: 30 TABLET | Refills: 0 | Status: SHIPPED | OUTPATIENT
Start: 2022-12-01 | End: 2023-01-09

## 2022-12-01 NOTE — PROGRESS NOTES
Chief Complaint  Hypothyroidism (Follow up ) and Vomiting (Monday morning started throwing up belly button pain, back pain, into left arm anytime she eats pt believes could be pregnant however has not taken a test)    Subjective        Laina Jones presents to [facility] for follow-up for hypothyroidism, concerns regarding nausea and vomiting, to discuss pregnancy.         Objective   Vital Signs:   /60 (BP Location: Left arm, Patient Position: Sitting, Cuff Size: Adult)   Pulse 73   Temp 98.1 °F (36.7 °C) (Temporal)   Wt 78.6 kg (173 lb 3.2 oz)   SpO2 96%   BMI 27.51 kg/m²     Physical Exam  Vitals and nursing note reviewed.   Constitutional:       Appearance: Normal appearance. She is normal weight.   HENT:      Head: Normocephalic and atraumatic.      Right Ear: Tympanic membrane, ear canal and external ear normal.      Left Ear: Tympanic membrane, ear canal and external ear normal.      Nose: Nose normal.      Mouth/Throat:      Mouth: Mucous membranes are moist.      Pharynx: Oropharynx is clear.   Eyes:      Conjunctiva/sclera: Conjunctivae normal.      Pupils: Pupils are equal, round, and reactive to light.   Cardiovascular:      Rate and Rhythm: Normal rate and regular rhythm.   Pulmonary:      Effort: Pulmonary effort is normal.      Breath sounds: Normal breath sounds.   Abdominal:      General: Abdomen is flat. Bowel sounds are normal.      Palpations: Abdomen is soft.   Neurological:      Mental Status: She is alert.   Psychiatric:         Mood and Affect: Mood normal.         Thought Content: Thought content normal.        Result Review :  {The following data was reviewed by ANKITA Downing on 12/01/22                Diagnoses and all orders for this visit:    1. Nausea and vomiting, unspecified vomiting type (Primary)  -     POC Pregnancy, Urine    2. Lymphoma, unspecified body region, unspecified lymphoma type (HCC)    3. Hypothyroidism, unspecified type    4.  Female fertility problem    5. Possible pregnancy    Other orders  -     ondansetron ODT (ZOFRAN-ODT) 4 MG disintegrating tablet; Place 1 tablet on the tongue Every 8 (Eight) Hours As Needed for Nausea or Vomiting.  Dispense: 30 tablet; Refill: 0    Urine pregnancy in office was negative.  Unlikely that she is pregnant.  She did have short menstrual cycle for her last cycle.  Otherwise she has been normal.  Has been trying to get pregnant for over 18 months.  Is scheduled to see OB/GYN coming up in January.  Has been followed by heme-onc, they have been working with her, still need MRI of brain.  Labs were reviewed, thyroid not an issue at this time.  Nausea and vomiting could be related to viral etiology, will send in Zofran for now.  Encouraged use of prenatal vitamin, taking daily and one that contains folic acid while she is try to get pregnant.  Also discussed having her  to seek out care, there could be some underlying health problems with him that could be preventing pregnancy as well.      Follow Up   Return if symptoms worsen or fail to improve.  Patient was given instructions and counseling regarding her condition or for health maintenance advice. Please see specific information pulled into the AVS if appropriate.     Manish Wilson, APRN  12/1/2022  This note was electronically signed.

## 2023-01-09 ENCOUNTER — OFFICE VISIT (OUTPATIENT)
Dept: NEUROLOGY | Facility: CLINIC | Age: 23
End: 2023-01-09
Payer: COMMERCIAL

## 2023-01-09 ENCOUNTER — OFFICE VISIT (OUTPATIENT)
Dept: OBSTETRICS AND GYNECOLOGY | Facility: CLINIC | Age: 23
End: 2023-01-09
Payer: COMMERCIAL

## 2023-01-09 VITALS
WEIGHT: 170.7 LBS | DIASTOLIC BLOOD PRESSURE: 53 MMHG | SYSTOLIC BLOOD PRESSURE: 99 MMHG | HEART RATE: 97 BPM | BODY MASS INDEX: 27.43 KG/M2 | HEIGHT: 66 IN

## 2023-01-09 VITALS
BODY MASS INDEX: 27.8 KG/M2 | WEIGHT: 173 LBS | HEIGHT: 66 IN | SYSTOLIC BLOOD PRESSURE: 91 MMHG | HEART RATE: 71 BPM | DIASTOLIC BLOOD PRESSURE: 60 MMHG

## 2023-01-09 DIAGNOSIS — N97.9 FEMALE FERTILITY PROBLEM: Primary | ICD-10-CM

## 2023-01-09 DIAGNOSIS — Q89.8 INFANTILE MYOFIBROMATOSIS: Primary | ICD-10-CM

## 2023-01-09 DIAGNOSIS — R42 DIZZINESS: ICD-10-CM

## 2023-01-09 PROCEDURE — 99215 OFFICE O/P EST HI 40 MIN: CPT | Performed by: NURSE PRACTITIONER

## 2023-01-09 PROCEDURE — 99202 OFFICE O/P NEW SF 15 MIN: CPT | Performed by: NURSE PRACTITIONER

## 2023-01-09 NOTE — PROGRESS NOTES
GYN Visit    CC:   Chief Complaint   Patient presents with   • Establish Care     Fertility        HPI:   22 y.o. Contraception or HRT: Contraception:  None    Had myofibromatosis as a child.  Has been trying to conceive for about 18 months.  Has tried preseed, ovulation predictor tests.  These tests show she is ovulating.    Cycle is about 28 days, last for 4 days.  Changes pad/tampon every 1 hours on heaviest days but does not have a super heavy day every time she cycles.   Her partner has not fathered any children.     Recent TFTs reviewed, normal    History: PMHx, Meds, Allergies, PSHx, Social Hx, and POBHx all reviewed and updated.    Review of Systems   Constitutional: Negative.    Genitourinary:        Trying to conceive       PHYSICAL EXAM:  BP 91/60   Pulse 71   Ht 167.6 cm (65.98\")   Wt 78.5 kg (173 lb)   BMI 27.94 kg/m²      Physical Exam    ASSESSMENT AND PLAN:  Diagnoses and all orders for this visit:    1. Female fertility problem (Primary)  -     US Non-ob Transvaginal; Future  -     Ambulatory Referral to Infertility        Counseling:  • Will check ultrasound, recommend referral to infertility due to cancer history.  Patient desires.    Follow Up:  Return as needed.          Gianluca Yepez, APRN  2023    [facility] [address]  [facility]  [address] [address]  Dept: [phone]  Loc: [phone]

## 2023-01-09 NOTE — PROGRESS NOTES
"Chief Complaint  Neurologic Problem (HA/dizziness)    Subjective          Laina Jones presents to [facility]  History of Present Illness  States about twice a week she gets up in the middle of the night to go to the restroom.  Will get extremely dizzy when she gets up.  Will feel hot all over and start sweating diffusely.  Will vomit.  Vomiting will last several hours. After she stops vomiting will get a severe headache.  Denies photophobia, phonophobia or nausea with the headache.  That headache will last 2+ days.       Objective   Vital Signs:   BP 99/53 Comment: sitting  Pulse 97   Ht 167.6 cm (66\")   Wt 77.4 kg (170 lb 11.2 oz)   BMI 27.55 kg/m²     Physical Exam  HENT:      Head: Normocephalic.   Pulmonary:      Effort: Pulmonary effort is normal.   Neurological:      Mental Status: She is alert and oriented to person, place, and time.      Sensory: Sensation is intact.      Motor: Motor function is intact.      Coordination: Coordination is intact.      Deep Tendon Reflexes: Reflexes are normal and symmetric.        Neurologic Exam     Mental Status   Oriented to person, place, and time.        Result Review :               Assessment and Plan    Diagnoses and all orders for this visit:    1. Infantile myofibromatosis (Primary)  Assessment & Plan:  Due to history, will order MRI brain to ensure no structural abnormality causing dizziness and intractable vomiting.     Orders:  -     MRI Brain With & Without Contrast; Future  -     Holter Monitor - 48 Hour; Future    2. Dizziness  Assessment & Plan:  Will order Holter monitor for further evaluation of dizziness.  Discussed that if the symptoms are only ocurring at night when she gets up, could be secondary to orthostatic hypotension.  She is hypotensive in the office today.  Encouraged her to get up slowly at night.  A tilt table test may be of benefit in the future.  Encouraged fluids and sodium. Discussed that " headache could be secondary to intractable vomiting as it only occurs at those time.      Orders:  -     MRI Brain With & Without Contrast; Future  -     Holter Monitor - 48 Hour; Future    I spent 45 minutes caring for Laina on this date of service. This time includes time spent by me in the following activities:preparing for the visit, reviewing tests, obtaining and/or reviewing a separately obtained history, performing a medically appropriate examination and/or evaluation , counseling and educating the patient/family/caregiver, ordering medications, tests, or procedures, documenting information in the medical record and independently interpreting results and communicating that information with the patient/family/caregiver  Follow Up   Return in about 3 months (around 4/9/2023) for Migraine f/u.  Patient was given instructions and counseling regarding her condition or for health maintenance advice. Please see specific information pulled into the AVS if appropriate.

## 2023-01-12 PROBLEM — Q89.8: Status: ACTIVE | Noted: 2022-09-01

## 2023-01-12 PROBLEM — R42 DIZZINESS: Status: ACTIVE | Noted: 2023-01-12

## 2023-01-13 NOTE — ASSESSMENT & PLAN NOTE
Due to history, will order MRI brain to ensure no structural abnormality causing dizziness and intractable vomiting.

## 2023-01-13 NOTE — ASSESSMENT & PLAN NOTE
Will order Holter monitor for further evaluation of dizziness.  Discussed that if the symptoms are only ocurring at night when she gets up, could be secondary to orthostatic hypotension.  She is hypotensive in the office today.  Encouraged her to get up slowly at night.  A tilt table test may be of benefit in the future.  Encouraged fluids and sodium. Discussed that headache could be secondary to intractable vomiting as it only occurs at those time.

## 2023-01-16 ENCOUNTER — HOSPITAL ENCOUNTER (OUTPATIENT)
Dept: CARDIOLOGY | Facility: HOSPITAL | Age: 23
Discharge: HOME | End: 2023-01-16
Admitting: NURSE PRACTITIONER
Payer: COMMERCIAL

## 2023-01-16 DIAGNOSIS — Q89.8 INFANTILE MYOFIBROMATOSIS: ICD-10-CM

## 2023-01-16 DIAGNOSIS — R42 DIZZINESS: ICD-10-CM

## 2023-01-16 PROCEDURE — 93225 XTRNL ECG REC<48 HRS REC: CPT

## 2023-01-16 PROCEDURE — 93226 XTRNL ECG REC<48 HR SCAN A/R: CPT

## 2023-01-23 LAB
MAXIMAL PREDICTED HEART RATE: 200 BPM
STRESS TARGET HR: 170 BPM

## 2023-01-23 PROCEDURE — 93227 XTRNL ECG REC<48 HR R&I: CPT | Performed by: INTERNAL MEDICINE

## 2023-01-27 ENCOUNTER — OFFICE VISIT (OUTPATIENT)
Dept: OBSTETRICS AND GYNECOLOGY | Facility: CLINIC | Age: 23
End: 2023-01-27
Payer: COMMERCIAL

## 2023-01-27 VITALS
BODY MASS INDEX: 26.84 KG/M2 | SYSTOLIC BLOOD PRESSURE: 109 MMHG | HEIGHT: 66 IN | HEART RATE: 73 BPM | WEIGHT: 167 LBS | DIASTOLIC BLOOD PRESSURE: 73 MMHG

## 2023-01-27 DIAGNOSIS — N92.6 IRREGULAR MENSES: Primary | ICD-10-CM

## 2023-01-27 LAB — HCG INTACT+B SERPL-ACNC: <1 MIU/ML

## 2023-01-27 PROCEDURE — 99212 OFFICE O/P EST SF 10 MIN: CPT | Performed by: NURSE PRACTITIONER

## 2023-01-27 PROCEDURE — 84702 CHORIONIC GONADOTROPIN TEST: CPT | Performed by: NURSE PRACTITIONER

## 2023-01-27 NOTE — PROGRESS NOTES
"GYN Visit    CC:   Chief Complaint   Patient presents with   • Menstrual Problem       HPI:   22 y.o. Contraception or HRT: Contraception:  None      Cycle was a week and a half late, started really light for 2-3 days, then was really heavy for 2 days, did have large clots, changing every 2 hours, then it just stopped.  Did have severe cramping.  Woke up this morning covered in sweat.      Took four home pregnancy tests, all negative.   Has not had her pelvic ultrasound yet and has not heard from referral to fertility specialist    History: PMHx, Meds, Allergies, PSHx, Social Hx, and POBHx all reviewed and updated.    Review of Systems   Constitutional: Negative.    Genitourinary:        Late, heavy cycle       PHYSICAL EXAM:  /73   Pulse 73   Ht 167.6 cm (65.98\")   Wt 75.8 kg (167 lb)   LMP 2023 (Exact Date)   BMI 26.97 kg/m²      Physical Exam  Vitals and nursing note reviewed.   Constitutional:       Appearance: Normal appearance. She is well-developed and well-groomed.   Neurological:      Mental Status: She is alert.   Psychiatric:         Attention and Perception: Attention and perception normal.         Mood and Affect: Affect normal.         Speech: Speech normal.         Behavior: Behavior is cooperative.         Cognition and Memory: Cognition normal.         ASSESSMENT AND PLAN:  Diagnoses and all orders for this visit:    1. Irregular menses (Primary)  Assessment & Plan:  Discussed need to check beta HCG levels, may have had an early miscarriage.  Explained may also not be able to definitively explain why her cycle was late and heavy.  Encouraged to keep appointment for ultrasound.  Will check on referral.    Orders:  -     hCG, Quantitative, Pregnancy      Counseling:  • TRACK MENSES, RTO if <q21d, >7d long, heavy or painful.      Follow Up:  Return for as needed after ultrasound.          Gianluca Yepez, APRN  2023    [facility] " [facility]  [address] [place] [address] [place] [address]  Dept: [phone]  Loc: [phone]

## 2023-01-27 NOTE — ASSESSMENT & PLAN NOTE
Discussed need to check beta HCG levels, may have had an early miscarriage.  Explained may also not be able to definitively explain why her cycle was late and heavy.  Encouraged to keep appointment for ultrasound.  Will check on referral.

## 2023-02-01 ENCOUNTER — HOSPITAL ENCOUNTER (OUTPATIENT)
Dept: ULTRASOUND IMAGING | Facility: HOSPITAL | Age: 23
Discharge: HOME | End: 2023-02-01
Payer: COMMERCIAL

## 2023-02-01 ENCOUNTER — HOSPITAL ENCOUNTER (OUTPATIENT)
Dept: MRI IMAGING | Facility: HOSPITAL | Age: 23
Discharge: HOME | End: 2023-02-01
Payer: COMMERCIAL

## 2023-02-01 DIAGNOSIS — Q89.8 INFANTILE MYOFIBROMATOSIS: ICD-10-CM

## 2023-02-01 DIAGNOSIS — R42 DIZZINESS: ICD-10-CM

## 2023-02-01 DIAGNOSIS — N97.9 FEMALE FERTILITY PROBLEM: ICD-10-CM

## 2023-02-01 PROCEDURE — 76830 TRANSVAGINAL US NON-OB: CPT

## 2023-02-01 PROCEDURE — 70551 MRI BRAIN STEM W/O DYE: CPT

## 2023-03-01 ENCOUNTER — OFFICE VISIT (OUTPATIENT)
Dept: INTERNAL MEDICINE | Facility: CLINIC | Age: 23
End: 2023-03-01
Payer: COMMERCIAL

## 2023-03-01 VITALS
DIASTOLIC BLOOD PRESSURE: 62 MMHG | HEART RATE: 90 BPM | TEMPERATURE: 98 F | SYSTOLIC BLOOD PRESSURE: 95 MMHG | WEIGHT: 160.8 LBS | BODY MASS INDEX: 25.97 KG/M2 | OXYGEN SATURATION: 100 %

## 2023-03-01 DIAGNOSIS — Z98.890 POSTOPERATIVE NAUSEA AND VOMITING: ICD-10-CM

## 2023-03-01 DIAGNOSIS — G89.18 POSTOPERATIVE PAIN: ICD-10-CM

## 2023-03-01 DIAGNOSIS — R11.2 POSTOPERATIVE NAUSEA AND VOMITING: ICD-10-CM

## 2023-03-01 DIAGNOSIS — N97.9 FEMALE FERTILITY PROBLEM: Primary | ICD-10-CM

## 2023-03-01 PROCEDURE — 99214 OFFICE O/P EST MOD 30 MIN: CPT | Performed by: NURSE PRACTITIONER

## 2023-03-01 RX ORDER — OXYCODONE HYDROCHLORIDE AND ACETAMINOPHEN 5; 325 MG/1; MG/1
1 TABLET ORAL
COMMUNITY
Start: 2023-02-21

## 2023-03-01 RX ORDER — ONDANSETRON 4 MG/1
4 TABLET, ORALLY DISINTEGRATING ORAL EVERY 8 HOURS PRN
Qty: 15 TABLET | Refills: 0 | Status: SHIPPED | OUTPATIENT
Start: 2023-03-01

## 2023-03-01 NOTE — PROGRESS NOTES
Chief Complaint  Hypothyroidism (Follow up ) and female fertility (Follow up )    Subjective        Laina Jones presents to [facility] for History of Present Illness  Following up for fertility issues, nausea, vomiting, pain.  Patient reports last week she underwent surgery with Dr. Hough, OB/GYN in [facility].  They took out her left fallopian tube.  Unclear of etiology, but patient states that he was not optimistic about fertility.  She is trying to get in with a fertility specialist currently, she was referred to them by local OB/GYN.  She has been having some pain in the left abdominal area, sweating, since immediately after surgery, nausea and vomiting.  She has used pain medication sparingly, stating it exacerbates those issues.  Was concerned if anything else was going on.  She has not had any surgical follow-up as of yet, is scheduled next Tuesday.       Objective   Vital Signs:   BP 95/62   Pulse 90   Temp 98 °F (36.7 °C) (Temporal)   Wt 72.9 kg (160 lb 12.8 oz)   SpO2 100%   BMI 25.97 kg/m²     Physical Exam  Vitals and nursing note reviewed.   Constitutional:       Appearance: Normal appearance. She is normal weight.   HENT:      Head: Normocephalic and atraumatic.      Right Ear: External ear normal.      Left Ear: External ear normal.   Pulmonary:      Effort: Pulmonary effort is normal.   Abdominal:      General: Abdomen is flat. Bowel sounds are normal.      Palpations: Abdomen is soft.      Comments: 3 laparoscopic incisions observed, none with any signs of infection.   Neurological:      Mental Status: She is alert.   Psychiatric:         Mood and Affect: Mood normal.         Thought Content: Thought content normal.        Result Review :  {The following data was reviewed by ANKITA Downing on 03/01/23                Diagnoses and all orders for this visit:    1. Female fertility problem (Primary)    2. Postoperative nausea and vomiting    3. Postoperative  pain    Other orders  -     Cancel: FluLaval/Fluzone >6 mos (6040-8816)  -     ondansetron ODT (ZOFRAN-ODT) 4 MG disintegrating tablet; Place 1 tablet under the tongue Every 8 (Eight) Hours As Needed for Nausea or Vomiting.  Dispense: 15 tablet; Refill: 0    Discussed with patient that she could use Zofran for nausea and vomiting, I do not think she is having any acute infection, she can safely wait until Tuesday for follow-up with OB/GYN to further discuss.  Monitor symptoms closely, if fever chills arise, I would definitely recommend very close follow-up or emergency room evaluation.  Discussed she can use Tylenol or Motrin as needed for pain, just being aware that her pain medicine does have Tylenol, she should not exceed 4 g of Tylenol daily.  In regards to her fertility, we did look at the website for the fertility specialist she was referred to, there was a note stating she needed to complete online packet, but unable to find that.  Patient will contact office and get further information in regards to finalizing referral.  Can follow-up with us as needed.    I spent 40 minutes caring for Laina on this date of service. This time includes time spent by me in the following activities:preparing for the visit, reviewing tests, obtaining and/or reviewing a separately obtained history, performing a medically appropriate examination and/or evaluation  and documenting information in the medical record  Follow Up   No follow-ups on file.  Patient was given instructions and counseling regarding her condition or for health maintenance advice. Please see specific information pulled into the AVS if appropriate.     Manish Wilson, ANKITA  3/1/2023  This note was electronically signed.

## 2023-04-17 ENCOUNTER — OFFICE VISIT (OUTPATIENT)
Dept: OBSTETRICS AND GYNECOLOGY | Facility: CLINIC | Age: 23
End: 2023-04-17
Payer: COMMERCIAL

## 2023-04-17 VITALS
WEIGHT: 163 LBS | SYSTOLIC BLOOD PRESSURE: 105 MMHG | BODY MASS INDEX: 26.2 KG/M2 | HEART RATE: 76 BPM | DIASTOLIC BLOOD PRESSURE: 66 MMHG | HEIGHT: 66 IN

## 2023-04-17 DIAGNOSIS — R14.0 BLOATING: ICD-10-CM

## 2023-04-17 DIAGNOSIS — N92.6 LATE PERIOD: Primary | ICD-10-CM

## 2023-04-17 NOTE — PROGRESS NOTES
"GYN Visit    CC:   Chief Complaint   Patient presents with   • Menstrual Problem       HPI:   23 y.o. Contraception or HRT: Contraception:  None     Had surgery 2023 with Dr. Hough, had her left tube removed.  States is planning to see our practice for continued care.  Did not have her cycle until the 3/29/23, about a week late.  Cycle was three days long, heavy and clots, did not bleed for one day, then had another day of bleeding after that.     Feels bloated all the time.  Always feels like she has a knot in her stomach.   Can eat three bites and feel full. Has had to reschedule her GI appointment.    Has been unable to complete the new patient packet for Dr. Mercedes's office      History: PMHx, Meds, Allergies, PSHx, Social Hx, and POBHx all reviewed and updated.    Review of Systems   Constitutional: Negative.    Gastrointestinal: Positive for abdominal distention.   Genitourinary: Positive for menstrual problem.       PHYSICAL EXAM:  /66   Pulse 76   Ht 167.6 cm (65.98\")   Wt 73.9 kg (163 lb)   LMP 2023   Breastfeeding No   BMI 26.32 kg/m²      Physical Exam  Vitals and nursing note reviewed.   Constitutional:       Appearance: Normal appearance. She is well-developed and well-groomed.   Neurological:      Mental Status: She is alert.   Psychiatric:         Attention and Perception: Attention and perception normal.         Mood and Affect: Affect normal.         Speech: Speech normal.         Behavior: Behavior is cooperative.         Cognition and Memory: Cognition normal.         ASSESSMENT AND PLAN:  Diagnoses and all orders for this visit:    1. Late period (Primary)  Comments:  Cycle likely late due to recent surgery, encouraged to continue to tract.  Will attempt to locate new patient paperwork for patient.     2. Bloating  Comments:  Encouarged to keep appointment with GI specialist        Counseling:  • TRACK MENSES, RTO if <q21d, >7d long, heavy or painful.      Follow " Up:  Return for as needed.          Gianluca Yepez, APRN  04/17/2023    [facility] [address] [facility]  [facility]  [address] [address]  Dept: [phone]  Loc: [phone]

## 2024-10-31 ENCOUNTER — OFFICE VISIT (OUTPATIENT)
Dept: INTERNAL MEDICINE | Facility: CLINIC | Age: 24
End: 2024-10-31
Payer: MEDICAID

## 2024-10-31 ENCOUNTER — LAB (OUTPATIENT)
Dept: LAB | Facility: HOSPITAL | Age: 24
End: 2024-10-31
Payer: MEDICAID

## 2024-10-31 VITALS
WEIGHT: 182.6 LBS | TEMPERATURE: 98.3 F | OXYGEN SATURATION: 99 % | SYSTOLIC BLOOD PRESSURE: 108 MMHG | DIASTOLIC BLOOD PRESSURE: 72 MMHG | BODY MASS INDEX: 29.35 KG/M2 | HEART RATE: 81 BPM | HEIGHT: 66 IN

## 2024-10-31 DIAGNOSIS — R61 EXCESSIVE SWEATING: ICD-10-CM

## 2024-10-31 DIAGNOSIS — Z13.29 THYROID DISORDER SCREENING: ICD-10-CM

## 2024-10-31 DIAGNOSIS — F31.81 BIPOLAR 2 DISORDER: ICD-10-CM

## 2024-10-31 DIAGNOSIS — N92.6 IRREGULAR MENSES: ICD-10-CM

## 2024-10-31 DIAGNOSIS — Z13.220 LIPID SCREENING: ICD-10-CM

## 2024-10-31 DIAGNOSIS — Q89.8 INFANTILE MYOFIBROMATOSIS: ICD-10-CM

## 2024-10-31 DIAGNOSIS — Z11.59 ENCOUNTER FOR HEPATITIS C SCREENING TEST FOR LOW RISK PATIENT: ICD-10-CM

## 2024-10-31 DIAGNOSIS — Z00.00 ANNUAL PHYSICAL EXAM: Primary | ICD-10-CM

## 2024-10-31 DIAGNOSIS — Z23 COVID-19 VACCINE ADMINISTERED: ICD-10-CM

## 2024-10-31 DIAGNOSIS — R10.84 GENERALIZED ABDOMINAL PAIN: ICD-10-CM

## 2024-10-31 DIAGNOSIS — Z23 NEEDS FLU SHOT: ICD-10-CM

## 2024-10-31 DIAGNOSIS — R51.9 FREQUENT HEADACHES: ICD-10-CM

## 2024-10-31 DIAGNOSIS — E66.3 OVERWEIGHT WITH BODY MASS INDEX (BMI) OF 25 TO 25.9 IN ADULT: ICD-10-CM

## 2024-10-31 DIAGNOSIS — R42 DIZZINESS: ICD-10-CM

## 2024-10-31 LAB
ALBUMIN SERPL-MCNC: 4.7 G/DL (ref 3.5–5.2)
ALBUMIN/GLOB SERPL: 1.6 G/DL
ALP SERPL-CCNC: 72 U/L (ref 39–117)
ALT SERPL W P-5'-P-CCNC: 20 U/L (ref 1–33)
ANION GAP SERPL CALCULATED.3IONS-SCNC: 11.1 MMOL/L (ref 5–15)
AST SERPL-CCNC: 16 U/L (ref 1–32)
BASOPHILS # BLD AUTO: 0.02 10*3/MM3 (ref 0–0.2)
BASOPHILS NFR BLD AUTO: 0.3 % (ref 0–1.5)
BILIRUB SERPL-MCNC: 0.5 MG/DL (ref 0–1.2)
BUN SERPL-MCNC: 8 MG/DL (ref 6–20)
BUN/CREAT SERPL: 10.3 (ref 7–25)
CALCIUM SPEC-SCNC: 10.3 MG/DL (ref 8.6–10.5)
CHLORIDE SERPL-SCNC: 103 MMOL/L (ref 98–107)
CHOLEST SERPL-MCNC: 164 MG/DL (ref 0–200)
CO2 SERPL-SCNC: 21.9 MMOL/L (ref 22–29)
CREAT SERPL-MCNC: 0.78 MG/DL (ref 0.57–1)
DEPRECATED RDW RBC AUTO: 40.6 FL (ref 37–54)
EGFRCR SERPLBLD CKD-EPI 2021: 110.3 ML/MIN/1.73
EOSINOPHIL # BLD AUTO: 0.44 10*3/MM3 (ref 0–0.4)
EOSINOPHIL NFR BLD AUTO: 6.8 % (ref 0.3–6.2)
ERYTHROCYTE [DISTWIDTH] IN BLOOD BY AUTOMATED COUNT: 12.4 % (ref 12.3–15.4)
FERRITIN SERPL-MCNC: 93 NG/ML (ref 13–150)
FOLATE SERPL-MCNC: 10.2 NG/ML (ref 4.78–24.2)
GLOBULIN UR ELPH-MCNC: 3 GM/DL
GLUCOSE SERPL-MCNC: 88 MG/DL (ref 65–99)
HBA1C MFR BLD: 5.2 % (ref 4.8–5.6)
HCT VFR BLD AUTO: 40.5 % (ref 34–46.6)
HCV AB SER QL: NORMAL
HDLC SERPL-MCNC: 60 MG/DL (ref 40–60)
HGB BLD-MCNC: 14 G/DL (ref 12–15.9)
IMM GRANULOCYTES # BLD AUTO: 0.01 10*3/MM3 (ref 0–0.05)
IMM GRANULOCYTES NFR BLD AUTO: 0.2 % (ref 0–0.5)
IRON 24H UR-MRATE: 72 MCG/DL (ref 37–145)
IRON SATN MFR SERPL: 20 % (ref 20–50)
LDLC SERPL CALC-MCNC: 94 MG/DL (ref 0–100)
LDLC/HDLC SERPL: 1.58 {RATIO}
LIPASE SERPL-CCNC: 18 U/L (ref 13–60)
LITHIUM SERPL-SCNC: 0.4 MMOL/L (ref 0.6–1.2)
LYMPHOCYTES # BLD AUTO: 1.18 10*3/MM3 (ref 0.7–3.1)
LYMPHOCYTES NFR BLD AUTO: 18.3 % (ref 19.6–45.3)
MAGNESIUM SERPL-MCNC: 2.2 MG/DL (ref 1.6–2.6)
MCH RBC QN AUTO: 30.8 PG (ref 26.6–33)
MCHC RBC AUTO-ENTMCNC: 34.6 G/DL (ref 31.5–35.7)
MCV RBC AUTO: 89.2 FL (ref 79–97)
MONOCYTES # BLD AUTO: 0.6 10*3/MM3 (ref 0.1–0.9)
MONOCYTES NFR BLD AUTO: 9.3 % (ref 5–12)
NEUTROPHILS NFR BLD AUTO: 4.21 10*3/MM3 (ref 1.7–7)
NEUTROPHILS NFR BLD AUTO: 65.1 % (ref 42.7–76)
NRBC BLD AUTO-RTO: 0 /100 WBC (ref 0–0.2)
PLATELET # BLD AUTO: 227 10*3/MM3 (ref 140–450)
PMV BLD AUTO: 11.3 FL (ref 6–12)
POTASSIUM SERPL-SCNC: 4.3 MMOL/L (ref 3.5–5.2)
PROLACTIN SERPL-MCNC: 45.4 NG/ML (ref 4.79–23.3)
PROT SERPL-MCNC: 7.7 G/DL (ref 6–8.5)
RBC # BLD AUTO: 4.54 10*6/MM3 (ref 3.77–5.28)
SODIUM SERPL-SCNC: 136 MMOL/L (ref 136–145)
T3FREE SERPL-MCNC: 3.45 PG/ML (ref 2–4.4)
T4 FREE SERPL-MCNC: 1.14 NG/DL (ref 0.92–1.68)
TIBC SERPL-MCNC: 356 MCG/DL (ref 298–536)
TRANSFERRIN SERPL-MCNC: 239 MG/DL (ref 200–360)
TRIGL SERPL-MCNC: 47 MG/DL (ref 0–150)
TSH SERPL DL<=0.05 MIU/L-ACNC: 1.52 UIU/ML (ref 0.27–4.2)
VLDLC SERPL-MCNC: 10 MG/DL (ref 5–40)
WBC NRBC COR # BLD AUTO: 6.46 10*3/MM3 (ref 3.4–10.8)

## 2024-10-31 PROCEDURE — 83735 ASSAY OF MAGNESIUM: CPT | Performed by: NURSE PRACTITIONER

## 2024-10-31 PROCEDURE — 83690 ASSAY OF LIPASE: CPT | Performed by: NURSE PRACTITIONER

## 2024-10-31 PROCEDURE — 86803 HEPATITIS C AB TEST: CPT | Performed by: NURSE PRACTITIONER

## 2024-10-31 PROCEDURE — 84146 ASSAY OF PROLACTIN: CPT | Performed by: NURSE PRACTITIONER

## 2024-10-31 PROCEDURE — 82746 ASSAY OF FOLIC ACID SERUM: CPT | Performed by: NURSE PRACTITIONER

## 2024-10-31 PROCEDURE — 85025 COMPLETE CBC W/AUTO DIFF WBC: CPT | Performed by: NURSE PRACTITIONER

## 2024-10-31 PROCEDURE — 84466 ASSAY OF TRANSFERRIN: CPT | Performed by: NURSE PRACTITIONER

## 2024-10-31 PROCEDURE — 80053 COMPREHEN METABOLIC PANEL: CPT | Performed by: NURSE PRACTITIONER

## 2024-10-31 PROCEDURE — 83036 HEMOGLOBIN GLYCOSYLATED A1C: CPT | Performed by: NURSE PRACTITIONER

## 2024-10-31 PROCEDURE — 80178 ASSAY OF LITHIUM: CPT | Performed by: NURSE PRACTITIONER

## 2024-10-31 PROCEDURE — 82728 ASSAY OF FERRITIN: CPT | Performed by: NURSE PRACTITIONER

## 2024-10-31 PROCEDURE — 84439 ASSAY OF FREE THYROXINE: CPT | Performed by: NURSE PRACTITIONER

## 2024-10-31 PROCEDURE — 80061 LIPID PANEL: CPT | Performed by: NURSE PRACTITIONER

## 2024-10-31 PROCEDURE — 86376 MICROSOMAL ANTIBODY EACH: CPT | Performed by: NURSE PRACTITIONER

## 2024-10-31 PROCEDURE — 84443 ASSAY THYROID STIM HORMONE: CPT | Performed by: NURSE PRACTITIONER

## 2024-10-31 PROCEDURE — 84481 FREE ASSAY (FT-3): CPT | Performed by: NURSE PRACTITIONER

## 2024-10-31 PROCEDURE — 83540 ASSAY OF IRON: CPT | Performed by: NURSE PRACTITIONER

## 2024-10-31 RX ORDER — LITHIUM 8 MEQ/5ML
SOLUTION ORAL
COMMUNITY
Start: 2024-10-30 | End: 2024-10-31

## 2024-10-31 RX ORDER — TRAZODONE HYDROCHLORIDE 50 MG/1
TABLET, FILM COATED ORAL
COMMUNITY
Start: 2024-10-30

## 2024-10-31 RX ORDER — LITHIUM CARBONATE 300 MG/1
300 CAPSULE ORAL
COMMUNITY

## 2024-10-31 NOTE — ASSESSMENT & PLAN NOTE
Patient just recently received diagnosis, was started on lithium yesterday, needing levels checked today for baseline, we will add to other labs, she will need to ensure her mental health provider has these records once completed to adjust dosing as needed.

## 2024-10-31 NOTE — ASSESSMENT & PLAN NOTE
Patient's (Body mass index is 29.49 kg/m².) indicates that they are overweight with health conditions that include none . Weight is newly identified. BMI is above average; BMI management plan is completed. We discussed portion control and increasing exercise.

## 2024-10-31 NOTE — PROGRESS NOTES
"Chief Complaint  Nausea (Ongoing for a few years ), Hot Flashes (Ongoing for a few years//Patient stated she will be dripping sweat. ), and Dizziness (Ongoing for a few years )    Subjective        Laina Jones presents to [facility] for follow-up.  Patient has not been seen since March 2023, shortly after this time she lost insurance and was unable to continue with her follow-ups and care.  Patient does come in today reporting she was diagnosed yesterday with bipolar 2 disorder, she was started on lithium, she does have lab order sheet from that provider of labs that they would recommend getting.  This is primarily due to ongoing symptoms, which include nausea for several years, abdominal pain, dizziness, excessive sweating, hot flashes.  She has not had any labs completed since 2022.  She was being seen by hematology oncology for infantile myofibroma ptosis and lymphoma.  She was working with OB/GYN for cycles and pain, did have a tube removed, but has not been beneficial in her overall symptoms.    Objective   Vital Signs:   /72 (BP Location: Left arm, Patient Position: Sitting, Cuff Size: Adult)   Pulse 81   Temp 98.3 °F (36.8 °C) (Temporal)   Ht 167.6 cm (65.98\")   Wt 82.8 kg (182 lb 9.6 oz)   SpO2 99%   BMI 29.49 kg/m²     Physical Exam  Vitals and nursing note reviewed.   Constitutional:       Appearance: Normal appearance.   HENT:      Head: Normocephalic and atraumatic.   Cardiovascular:      Rate and Rhythm: Normal rate and regular rhythm.   Pulmonary:      Effort: Pulmonary effort is normal.      Breath sounds: Normal breath sounds.   Neurological:      Mental Status: She is alert.   Psychiatric:         Mood and Affect: Mood normal.         Thought Content: Thought content normal.        Result Review :  {The following data was reviewed by ANKITA Downing on 10/31/24                Diagnoses and all orders for this visit:    1. Annual physical exam " (Primary)  Assessment & Plan:  Screening labs reviewed/ordered  Counseling provided regarding age appropriate screenings and immunizations, healthy diet and exercise.       Orders:  -     Comprehensive Metabolic Panel  -     CBC & Differential  -     TSH  -     Lipid Panel    2. Lipid screening  -     Lipid Panel    3. Thyroid disorder screening  -     TSH    4. Encounter for hepatitis C screening test for low risk patient  -     Hepatitis C Antibody    5. Overweight with body mass index (BMI) of 25 to 25.9 in adult  Assessment & Plan:  Patient's (Body mass index is 29.49 kg/m².) indicates that they are overweight with health conditions that include none . Weight is newly identified. BMI is above average; BMI management plan is completed. We discussed portion control and increasing exercise.       6. Excessive sweating  -     Comprehensive Metabolic Panel  -     CBC & Differential  -     TSH  -     Folate  -     Ferritin  -     Iron Profile  -     Hemoglobin A1c  -     Lithium level  -     Lipase  -     Magnesium  -     Prolactin  -     Thyroid Peroxidase Antibody  -     T4, Free  -     T3, Free    7. Generalized abdominal pain  -     Comprehensive Metabolic Panel  -     CBC & Differential  -     TSH  -     Folate  -     Ferritin  -     Iron Profile  -     Hemoglobin A1c  -     Lithium level  -     Lipase  -     Magnesium  -     Prolactin  -     Thyroid Peroxidase Antibody  -     T4, Free  -     T3, Free    8. Frequent headaches  -     Comprehensive Metabolic Panel  -     CBC & Differential  -     TSH  -     Folate  -     Ferritin  -     Iron Profile  -     Hemoglobin A1c  -     Lithium level  -     Lipase  -     Magnesium  -     Prolactin  -     Thyroid Peroxidase Antibody  -     T4, Free  -     T3, Free  -     Ambulatory Referral to Neurology    9. Dizziness  -     Comprehensive Metabolic Panel  -     CBC & Differential  -     TSH  -     Folate  -     Ferritin  -     Iron Profile  -     Hemoglobin A1c  -      Lithium level  -     Lipase  -     Magnesium  -     Prolactin  -     Thyroid Peroxidase Antibody  -     T4, Free  -     T3, Free  -     Ambulatory Referral to Neurology    10. Irregular menses    11. Bipolar 2 disorder  Assessment & Plan:  Patient just recently received diagnosis, was started on lithium yesterday, needing levels checked today for baseline, we will add to other labs, she will need to ensure her mental health provider has these records once completed to adjust dosing as needed.      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  She had infantile mild fibromatosis and lymphoma, she did undergo surgery as a very young child.  She underwent chemotherapy for 1 year.  She has since been in remission.  Her CT scan in 2022 was overall unremarkable, MRI was completed last year, but she never had any follow-up, findings consistent with her reported history.  Does not appear there are any significant acute changes, however I do think given her history, she should follow-up with neurology regardless.  Also should focus on following up with heme-onc in the future, but we will focus on labs today.  She does not have extensive labs in our system, but did review all previous labs that we had.  We will follow-up with her once those results are available and determine our next steps from there.    I spent greater than 40 minutes caring for Laina on this date of service. This time includes time spent by me in the following activities:preparing for the visit, reviewing tests, obtaining and/or reviewing a separately obtained history, performing a medically appropriate examination and/or evaluation , counseling and educating the patient/family/caregiver, ordering medications, tests, or procedures, referring and communicating with other health care professionals , documenting information in the medical record, and independently interpreting results and  communicating that information with the patient/family/caregiver  Follow Up   No follow-ups on file.  Patient was given instructions and counseling regarding her condition or for health maintenance advice. Please see specific information pulled into the AVS if appropriate.     Manish Wilson, ANKITA  10/31/2024  This note was electronically signed.

## 2024-11-01 LAB — THYROPEROXIDASE AB SERPL-ACNC: 16 IU/ML (ref 0–34)

## 2024-11-06 ENCOUNTER — TRANSCRIBE ORDERS (OUTPATIENT)
Facility: HOSPITAL | Age: 24
End: 2024-11-06
Payer: MEDICAID

## 2024-11-06 ENCOUNTER — LAB (OUTPATIENT)
Facility: HOSPITAL | Age: 24
End: 2024-11-06
Payer: MEDICAID

## 2024-11-06 DIAGNOSIS — F31.81 BIPOLAR II DISORDER: ICD-10-CM

## 2024-11-06 DIAGNOSIS — F31.81 BIPOLAR II DISORDER: Primary | ICD-10-CM

## 2024-11-06 LAB
B-HCG UR QL: NEGATIVE
ESTRADIOL SERPL HS-MCNC: 83.5 PG/ML
FSH SERPL-ACNC: 4.12 MIU/ML
HCG INTACT+B SERPL-ACNC: <1 MIU/ML
LH SERPL-ACNC: 4.04 MIU/ML

## 2024-11-06 PROCEDURE — 83001 ASSAY OF GONADOTROPIN (FSH): CPT

## 2024-11-06 PROCEDURE — 81025 URINE PREGNANCY TEST: CPT

## 2024-11-06 PROCEDURE — 83002 ASSAY OF GONADOTROPIN (LH): CPT

## 2024-11-06 PROCEDURE — 84702 CHORIONIC GONADOTROPIN TEST: CPT

## 2024-11-06 PROCEDURE — 82670 ASSAY OF TOTAL ESTRADIOL: CPT

## 2024-11-06 PROCEDURE — 36415 COLL VENOUS BLD VENIPUNCTURE: CPT

## 2024-11-18 NOTE — PROGRESS NOTES
GYN Problem/Follow Up Visit    Chief Complaint   Patient presents with    Fertlilty      Thx of left salpingectomy, left side pain            HPI  Laina Jones is a 24 y.o. female, , who presents for left sided pelvic pain for 2 years, was under care of Dr. Payam urena, underwent laparoscopy left salpingectomy for management persistent left sided pelvic pain, hydrosalpinx; PROCEDURE:  LEFT SALPINGECTOMY 2023  FINAL DIAGNOSIS:   LEFT FALLOPIAN TUBE, EXCISION:        Chronic salpingitis.        Multifocal serosal adhesions are present.        Marked vascular congestion.        Paratubal lymphoid aggregates.      The left sided pelvic pain has continued. The pelvic pain occurs every few days, comes and goes, described as ache sensation. Fetal position improves.   Hx Chlamydia, she denies concerns for STI or testing for STI    Recently found to have elevated prolactin level   T3, Free (10/31/2024 11:22)    T4, Free (10/31/2024 11:22)    Thyroid Peroxidase Antibody (10/31/2024 11:22)    Prolactin (10/31/2024 11:22)    Normal bowel and bladder habits  Desires pregnancy, off contraception for 5 years. Timed intercourse with same partner for 5 years, no pregnancy has occurred. Current partner has not fathered children in the past.   Menses monthly, lasting 3-7 days, menstrual cramps can be severe in lower abdomen and back during menstruation, no improving factors  Hx of acne, excessive hairgrowth of the belly    Declines pelvic exam- tearful and states she is not comfortable with having a pelvic exam today.     Additional OB/GYN History   Patient's last menstrual period was 2024.  Current contraception: contraceptive methods: None    Past Medical History:   Diagnosis Date    ADHD (attention deficit hyperactivity disorder) 2012    Anxiety 2015    Bipolar disorder     Cancer     Cluster headache     my headaches became consistently in 2015    Depression 2015    Disease of thyroid gland      "Female infertility 2021    Headache, tension-type     Irritable bowel syndrome 2019    Lymphoma     Migraine 2015    Myofibromatosis       Past Surgical History:   Procedure Laterality Date    APPENDECTOMY      CHOLECYSTECTOMY      SALPINGECTOMY Left 02/21/2023    TUMOR REMOVAL        Family History   Problem Relation Age of Onset    Migraines Mother     Anxiety disorder Mother     Depression Mother     Drug abuse Mother     Seizures Father     Anxiety disorder Father     Depression Father     Drug abuse Father     Mental illness Father     Thyroid disease Father     Diabetes Maternal Grandmother     Cancer Paternal Grandmother         overian cancer    Hypertension Paternal Grandmother      Allergies as of 11/19/2024 - Reviewed 11/19/2024   Allergen Reaction Noted    Contrast dye (echo or unknown ct/mr) Anaphylaxis 06/18/2022    Latex Anaphylaxis 08/02/2022    Gadolinium derivatives Unknown - Low Severity 09/30/2019      The additional following portions of the patient's history were reviewed and updated as appropriate: allergies, current medications, past family history, past medical history, past social history, past surgical history, and problem list.    Review of Systems    See HPI for pertinent ROS    Objective   /83   Pulse 67   Ht 167.6 cm (65.98\")   Wt 83.5 kg (184 lb)   LMP 11/06/2024 Comment: Heavy flow, wearing super plus tampons, chnaging every 2 hrs, lasting 3-7 days  Breastfeeding No   BMI 29.72 kg/m²     Physical Exam  Vitals and nursing note reviewed.   Constitutional:       Appearance: Normal appearance. She is well-developed and well-groomed.   Cardiovascular:      Rate and Rhythm: Normal rate.   Pulmonary:      Effort: Pulmonary effort is normal.   Lymphadenopathy:      Cervical: No cervical adenopathy.   Skin:     General: Skin is warm and dry.      Findings: Acne present.   Neurological:      Mental Status: She is alert and oriented to person, place, and time.   Psychiatric:         " Mood and Affect: Mood is anxious (tearful).         Cognition and Memory: Cognition normal.     Declines pelvic exam       Assessment and Plan    Diagnoses and all orders for this visit:    1. Dysmenorrhea (Primary)  -     Prolactin  -     DHEA-Sulfate  -     Insulin, Total  -     Testosterone  -     17-Hydroxyprogesterone  -     US Non-ob Transvaginal; Future  -     POC Pregnancy, Urine    2. Acne vulgaris  -     DHEA-Sulfate  -     Testosterone  -     17-Hydroxyprogesterone  -     US Non-ob Transvaginal; Future    3. Pelvic pain  -     US Non-ob Transvaginal; Future  -     POC Pregnancy, Urine    Other orders  -     Prenatal Vit-Fe Fumarate-FA (PNV Prenatal Plus Multivitamin) 27-1 MG tablet; Take 1 tablet by mouth Daily.  Dispense: 90 tablet; Refill: 3      HCG, Urine, QL   Date Value Ref Range Status   11/19/2024 Negative Negative Final       Counseling:    PNV daily and healthy lifestyle if desires pregnancy  Due to desires for pregnancy, recommend discuss teratogenic risk/safety/alternatives of the medications she is currently for bipolar depression, should she conceive.   She continues with left sided pelvic pain s/p left salpingectomy, will obtain pelvic ultrasound to assess for structural cause of pelvic pain. Additionally, obtain labwork to evaluate for PCOS due to hx acne, hirsutism, infertility. She had a recent elevated prolactin level. She states she has bilateral nipple piercing and frequent stimulation of the nipples during intimacy. She states the prolactin elevation was found prior to starting medications for bipolar depression. She is tearful today and declines a pelvic exam.  She declines STI testing today.      She understands the importance of having any ordered tests to be performed in a timely fashion.  The risks of not performing them include, but are not limited to, advanced cancer stages, bone loss from osteoporosis and/or subsequent increase in morbidity and/or mortality.  She is  encouraged to review her results online and/or contact or office if she has questions.     Follow Up:  Return if symptoms worsen or fail to improve, for will call with results of lab and pelvic ultrasound and treatment recommendations.        Joy Neville, APRN  11/19/2024

## 2024-11-19 ENCOUNTER — OFFICE VISIT (OUTPATIENT)
Dept: OBSTETRICS AND GYNECOLOGY | Facility: CLINIC | Age: 24
End: 2024-11-19
Payer: MEDICAID

## 2024-11-19 VITALS
HEART RATE: 67 BPM | DIASTOLIC BLOOD PRESSURE: 83 MMHG | WEIGHT: 184 LBS | BODY MASS INDEX: 29.57 KG/M2 | HEIGHT: 66 IN | SYSTOLIC BLOOD PRESSURE: 123 MMHG

## 2024-11-19 DIAGNOSIS — N94.6 DYSMENORRHEA: Primary | ICD-10-CM

## 2024-11-19 DIAGNOSIS — L70.0 ACNE VULGARIS: ICD-10-CM

## 2024-11-19 DIAGNOSIS — R10.2 PELVIC PAIN: ICD-10-CM

## 2024-11-19 LAB
B-HCG UR QL: NEGATIVE
EXPIRATION DATE: NORMAL
INTERNAL NEGATIVE CONTROL: NORMAL
INTERNAL POSITIVE CONTROL: NORMAL
Lab: NORMAL
PROLACTIN SERPL-MCNC: 44.1 NG/ML (ref 4.79–23.3)
TESTOST SERPL-MCNC: 28.3 NG/DL (ref 8.4–48.1)

## 2024-11-19 PROCEDURE — 83525 ASSAY OF INSULIN: CPT | Performed by: NURSE PRACTITIONER

## 2024-11-19 PROCEDURE — 84146 ASSAY OF PROLACTIN: CPT | Performed by: NURSE PRACTITIONER

## 2024-11-19 PROCEDURE — 82627 DEHYDROEPIANDROSTERONE: CPT | Performed by: NURSE PRACTITIONER

## 2024-11-19 PROCEDURE — 83498 ASY HYDROXYPROGESTERONE 17-D: CPT | Performed by: NURSE PRACTITIONER

## 2024-11-19 PROCEDURE — 84403 ASSAY OF TOTAL TESTOSTERONE: CPT | Performed by: NURSE PRACTITIONER

## 2024-11-19 RX ORDER — PRAZOSIN HYDROCHLORIDE 5 MG/1
CAPSULE ORAL
COMMUNITY
Start: 2024-11-15

## 2024-11-19 RX ORDER — VITAMIN A ACETATE, .BETA.-CAROTENE, ASCORBIC ACID, CHOLECALCIFEROL, .ALPHA.-TOCOPHEROL ACETATE, DL-, THIAMINE MONONITRATE, RIBOFLAVIN, NIACINAMIDE, PYRIDOXINE HYDROCHLORIDE, FOLIC ACID, CYANOCOBALAMIN, CALCIUM CARBONATE, FERROUS FUMARATE, ZINC OXIDE, AND CUPRIC OXIDE 2000; 2000; 120; 400; 22; 1.84; 3; 20; 10; 1; 12; 200; 27; 25; 2 [IU]/1; [IU]/1; MG/1; [IU]/1; MG/1; MG/1; MG/1; MG/1; MG/1; MG/1; UG/1; MG/1; MG/1; MG/1; MG/1
1 TABLET ORAL DAILY
Qty: 90 TABLET | Refills: 3 | Status: SHIPPED | OUTPATIENT
Start: 2024-11-19

## 2024-11-20 DIAGNOSIS — E22.1 HYPERPROLACTINEMIA: Primary | ICD-10-CM

## 2024-11-21 LAB
DHEA-S SERPL-MCNC: 188 UG/DL (ref 110–431.7)
INSULIN SERPL-ACNC: 6 UIU/ML (ref 2.6–24.9)

## 2024-11-24 LAB — 17OHP SERPL-MCNC: 51 NG/DL

## 2024-11-27 ENCOUNTER — TELEPHONE (OUTPATIENT)
Dept: OBSTETRICS AND GYNECOLOGY | Facility: CLINIC | Age: 24
End: 2024-11-27
Payer: MEDICAID

## 2024-11-27 NOTE — TELEPHONE ENCOUNTER
----- Message from Joy Neville sent at 11/20/2024  2:56 PM EST -----  Persistent prolactin elevation, recommend referral to endocrinology, please assist in scheduling

## 2024-12-05 ENCOUNTER — HOSPITAL ENCOUNTER (OUTPATIENT)
Dept: ULTRASOUND IMAGING | Facility: HOSPITAL | Age: 24
Discharge: HOME | End: 2024-12-05
Admitting: NURSE PRACTITIONER
Payer: MEDICAID

## 2024-12-05 DIAGNOSIS — L70.0 ACNE VULGARIS: ICD-10-CM

## 2024-12-05 DIAGNOSIS — N94.6 DYSMENORRHEA: ICD-10-CM

## 2024-12-05 DIAGNOSIS — R10.2 PELVIC PAIN: ICD-10-CM

## 2024-12-05 PROCEDURE — 76830 TRANSVAGINAL US NON-OB: CPT

## 2024-12-09 ENCOUNTER — TRANSCRIBE ORDERS (OUTPATIENT)
Dept: ADMINISTRATIVE | Facility: HOSPITAL | Age: 24
End: 2024-12-09
Payer: MEDICAID

## 2024-12-09 ENCOUNTER — LAB (OUTPATIENT)
Facility: HOSPITAL | Age: 24
End: 2024-12-09
Payer: MEDICAID

## 2024-12-09 ENCOUNTER — TELEPHONE (OUTPATIENT)
Dept: OBSTETRICS AND GYNECOLOGY | Facility: CLINIC | Age: 24
End: 2024-12-09
Payer: MEDICAID

## 2024-12-09 DIAGNOSIS — N07.9: ICD-10-CM

## 2024-12-09 DIAGNOSIS — E88.810 DYSMETABOLIC SYNDROME X: ICD-10-CM

## 2024-12-09 DIAGNOSIS — E22.1 IDIOPATHIC HYPERPROLACTINEMIA: Primary | ICD-10-CM

## 2024-12-09 DIAGNOSIS — E22.1 IDIOPATHIC HYPERPROLACTINEMIA: ICD-10-CM

## 2024-12-09 LAB
ANION GAP SERPL CALCULATED.3IONS-SCNC: 8.7 MMOL/L (ref 5–15)
BUN SERPL-MCNC: 11 MG/DL (ref 6–20)
BUN/CREAT SERPL: 15.1 (ref 7–25)
CALCIUM SPEC-SCNC: 9.4 MG/DL (ref 8.6–10.5)
CHLORIDE SERPL-SCNC: 104 MMOL/L (ref 98–107)
CO2 SERPL-SCNC: 24.3 MMOL/L (ref 22–29)
CREAT SERPL-MCNC: 0.73 MG/DL (ref 0.57–1)
EGFRCR SERPLBLD CKD-EPI 2021: 119.4 ML/MIN/1.73
FSH SERPL-ACNC: 6.52 MIU/ML
GLUCOSE SERPL-MCNC: 89 MG/DL (ref 65–99)
LH SERPL-ACNC: 4.6 MIU/ML
POTASSIUM SERPL-SCNC: 3.8 MMOL/L (ref 3.5–5.2)
PROLACTIN SERPL-MCNC: 25.8 NG/ML (ref 4.79–23.3)
SODIUM SERPL-SCNC: 137 MMOL/L (ref 136–145)

## 2024-12-09 PROCEDURE — 36415 COLL VENOUS BLD VENIPUNCTURE: CPT

## 2024-12-09 PROCEDURE — 84305 ASSAY OF SOMATOMEDIN: CPT

## 2024-12-09 PROCEDURE — 82627 DEHYDROEPIANDROSTERONE: CPT

## 2024-12-09 PROCEDURE — 83002 ASSAY OF GONADOTROPIN (LH): CPT

## 2024-12-09 PROCEDURE — 84146 ASSAY OF PROLACTIN: CPT

## 2024-12-09 PROCEDURE — 80048 BASIC METABOLIC PNL TOTAL CA: CPT

## 2024-12-09 PROCEDURE — 83003 ASSAY GROWTH HORMONE (HGH): CPT

## 2024-12-09 PROCEDURE — 83001 ASSAY OF GONADOTROPIN (FSH): CPT

## 2024-12-09 NOTE — TELEPHONE ENCOUNTER
----- Message from Joy Neville sent at 12/9/2024 12:27 PM EST -----  Your pelvic ultrasound was normal, no ultrasound findings to explain the recently reported chronic left sided pelvic pain. I recommend follow up with your PCP to evaluate for non gyn causes of pelvic pain.

## 2024-12-10 LAB — DHEA-S SERPL-MCNC: 226 UG/DL (ref 110–431.7)

## 2024-12-11 LAB
GH SERPL-MCNC: 0.1 NG/ML (ref 0–10)
IGF-I SERPL-MCNC: 238 NG/ML (ref 101–347)

## 2024-12-19 ENCOUNTER — LAB (OUTPATIENT)
Facility: HOSPITAL | Age: 24
End: 2024-12-19
Payer: MEDICAID

## 2024-12-19 ENCOUNTER — TRANSCRIBE ORDERS (OUTPATIENT)
Facility: HOSPITAL | Age: 24
End: 2024-12-19
Payer: MEDICAID

## 2024-12-19 DIAGNOSIS — F31.12 BIPOLAR 1 DISORDER, MANIC, MODERATE: ICD-10-CM

## 2024-12-19 DIAGNOSIS — E88.810 DYSMETABOLIC SYNDROME X: ICD-10-CM

## 2024-12-19 DIAGNOSIS — N07.9: ICD-10-CM

## 2024-12-19 DIAGNOSIS — E22.1 IDIOPATHIC HYPERPROLACTINEMIA: ICD-10-CM

## 2024-12-19 DIAGNOSIS — F31.12 BIPOLAR 1 DISORDER, MANIC, MODERATE: Primary | ICD-10-CM

## 2024-12-19 LAB
CORTIS AM PEAK SERPL-MCNC: 15.15 MCG/DL (ref 6.02–18.4)
LITHIUM SERPL-SCNC: 1.1 MMOL/L (ref 0.6–1.2)

## 2024-12-19 PROCEDURE — 82024 ASSAY OF ACTH: CPT

## 2024-12-19 PROCEDURE — 82533 TOTAL CORTISOL: CPT

## 2024-12-19 PROCEDURE — 80178 ASSAY OF LITHIUM: CPT

## 2024-12-19 PROCEDURE — 36415 COLL VENOUS BLD VENIPUNCTURE: CPT

## 2024-12-20 LAB — ACTH PLAS-MCNC: 30.5 PG/ML (ref 7.2–63.3)

## 2025-02-06 ENCOUNTER — TRANSCRIBE ORDERS (OUTPATIENT)
Dept: ADMINISTRATIVE | Facility: HOSPITAL | Age: 25
End: 2025-02-06
Payer: MEDICAID

## 2025-02-06 ENCOUNTER — LAB (OUTPATIENT)
Facility: HOSPITAL | Age: 25
End: 2025-02-06
Payer: MEDICAID

## 2025-02-06 DIAGNOSIS — N97.9 PRIMARY FEMALE INFERTILITY: ICD-10-CM

## 2025-02-06 DIAGNOSIS — E88.810 DYSMETABOLIC SYNDROME X: ICD-10-CM

## 2025-02-06 DIAGNOSIS — E22.1 IDIOPATHIC HYPERPROLACTINEMIA: Primary | ICD-10-CM

## 2025-02-06 DIAGNOSIS — E22.1 IDIOPATHIC HYPERPROLACTINEMIA: ICD-10-CM

## 2025-02-06 LAB
ANION GAP SERPL CALCULATED.3IONS-SCNC: 8.7 MMOL/L (ref 5–15)
BUN SERPL-MCNC: 8 MG/DL (ref 6–20)
BUN/CREAT SERPL: 10 (ref 7–25)
CALCIUM SPEC-SCNC: 9.6 MG/DL (ref 8.6–10.5)
CHLORIDE SERPL-SCNC: 106 MMOL/L (ref 98–107)
CO2 SERPL-SCNC: 22.3 MMOL/L (ref 22–29)
CREAT SERPL-MCNC: 0.8 MG/DL (ref 0.57–1)
EGFRCR SERPLBLD CKD-EPI 2021: 107 ML/MIN/1.73
GLUCOSE SERPL-MCNC: 89 MG/DL (ref 65–99)
POTASSIUM SERPL-SCNC: 4.2 MMOL/L (ref 3.5–5.2)
PROLACTIN SERPL-MCNC: 5.36 NG/ML (ref 4.79–23.3)
SODIUM SERPL-SCNC: 137 MMOL/L (ref 136–145)

## 2025-02-06 PROCEDURE — 83525 ASSAY OF INSULIN: CPT

## 2025-02-06 PROCEDURE — 36415 COLL VENOUS BLD VENIPUNCTURE: CPT

## 2025-02-06 PROCEDURE — 80048 BASIC METABOLIC PNL TOTAL CA: CPT

## 2025-02-06 PROCEDURE — 84681 ASSAY OF C-PEPTIDE: CPT

## 2025-02-06 PROCEDURE — 84146 ASSAY OF PROLACTIN: CPT

## 2025-02-07 LAB
C PEPTIDE SERPL-MCNC: 2.4 NG/ML (ref 1.1–4.4)
INSULIN SERPL-ACNC: 10.6 UIU/ML (ref 2.6–24.9)

## 2025-05-19 ENCOUNTER — TRANSCRIBE ORDERS (OUTPATIENT)
Dept: ADMINISTRATIVE | Facility: HOSPITAL | Age: 25
End: 2025-05-19
Payer: COMMERCIAL

## 2025-05-19 ENCOUNTER — LAB (OUTPATIENT)
Facility: HOSPITAL | Age: 25
End: 2025-05-19
Payer: COMMERCIAL

## 2025-05-19 DIAGNOSIS — E22.1 IDIOPATHIC HYPERPROLACTINEMIA: ICD-10-CM

## 2025-05-19 DIAGNOSIS — E22.1 IDIOPATHIC HYPERPROLACTINEMIA: Primary | ICD-10-CM

## 2025-05-19 LAB — CORTIS AM PEAK SERPL-MCNC: 1.02 MCG/DL (ref 6.02–18.4)

## 2025-05-19 PROCEDURE — 80299 QUANTITATIVE ASSAY DRUG: CPT

## 2025-05-19 PROCEDURE — 82533 TOTAL CORTISOL: CPT

## 2025-05-19 PROCEDURE — 36415 COLL VENOUS BLD VENIPUNCTURE: CPT

## 2025-06-02 LAB — DEXAMETHASONE SERPL-MCNC: 179 NG/DL

## 2025-06-25 ENCOUNTER — TELEPHONE (OUTPATIENT)
Dept: NEUROLOGY | Facility: CLINIC | Age: 25
End: 2025-06-25

## 2025-06-25 NOTE — TELEPHONE ENCOUNTER
I called this patient to r/s her 7/23 appointment, next available follow up is 10/23 with Mely.     Patient has previously no showed two appts and cancelled one (for weather).     Patient is asking to see someone else in office as she states that she would like to see someone sooner than October. I did tell her I would have to ask for a ERNESTINA in office.

## 2025-07-22 ENCOUNTER — OFFICE VISIT (OUTPATIENT)
Dept: INTERNAL MEDICINE | Facility: CLINIC | Age: 25
End: 2025-07-22
Payer: COMMERCIAL

## 2025-07-22 VITALS
RESPIRATION RATE: 16 BRPM | DIASTOLIC BLOOD PRESSURE: 72 MMHG | WEIGHT: 208.2 LBS | TEMPERATURE: 97.9 F | BODY MASS INDEX: 34.69 KG/M2 | SYSTOLIC BLOOD PRESSURE: 114 MMHG | OXYGEN SATURATION: 96 % | HEART RATE: 76 BPM | HEIGHT: 65 IN

## 2025-07-22 DIAGNOSIS — K59.00 CONSTIPATION, UNSPECIFIED CONSTIPATION TYPE: ICD-10-CM

## 2025-07-22 DIAGNOSIS — R19.7 DIARRHEA, UNSPECIFIED TYPE: ICD-10-CM

## 2025-07-22 DIAGNOSIS — R11.2 NAUSEA AND VOMITING, UNSPECIFIED VOMITING TYPE: Primary | ICD-10-CM

## 2025-07-22 DIAGNOSIS — R10.84 GENERALIZED ABDOMINAL PAIN: ICD-10-CM

## 2025-07-22 LAB
B-HCG UR QL: NEGATIVE
EXPIRATION DATE: NORMAL
FLUAV AG UPPER RESP QL IA.RAPID: NOT DETECTED
FLUBV AG UPPER RESP QL IA.RAPID: NOT DETECTED
INTERNAL CONTROL: NORMAL
INTERNAL CONTROL: NORMAL
INTERNAL NEGATIVE CONTROL: NORMAL
INTERNAL POSITIVE CONTROL: NORMAL
Lab: NORMAL
S PYO AG THROAT QL: NEGATIVE
SARS-COV-2 AG UPPER RESP QL IA.RAPID: NOT DETECTED

## 2025-07-22 PROCEDURE — 99213 OFFICE O/P EST LOW 20 MIN: CPT | Performed by: PHYSICIAN ASSISTANT

## 2025-07-22 PROCEDURE — 1159F MED LIST DOCD IN RCRD: CPT | Performed by: PHYSICIAN ASSISTANT

## 2025-07-22 PROCEDURE — 87428 SARSCOV & INF VIR A&B AG IA: CPT | Performed by: PHYSICIAN ASSISTANT

## 2025-07-22 PROCEDURE — 1160F RVW MEDS BY RX/DR IN RCRD: CPT | Performed by: PHYSICIAN ASSISTANT

## 2025-07-22 PROCEDURE — 1126F AMNT PAIN NOTED NONE PRSNT: CPT | Performed by: PHYSICIAN ASSISTANT

## 2025-07-22 PROCEDURE — 81025 URINE PREGNANCY TEST: CPT | Performed by: PHYSICIAN ASSISTANT

## 2025-07-22 PROCEDURE — 87880 STREP A ASSAY W/OPTIC: CPT | Performed by: PHYSICIAN ASSISTANT

## 2025-07-22 RX ORDER — LUMATEPERONE 21 MG/1
42 CAPSULE ORAL DAILY
COMMUNITY

## 2025-07-22 NOTE — ASSESSMENT & PLAN NOTE
Concern that some abdominal pain is secondary to constipation.  Will order KUB to monitor for stool burden and concerns for obstruction.  Miralax cleanout information given to patient.  If she is still having symptoms 1-2 days after cleanout, would recommend stool studies to evaluate for other etiologies.  Also discussed getting CBC, CMP, TSH, Lipase, and Alpha-gal if symptoms persist over the next few days.  If patient develops new or worsening symptoms she needs to be re-evaluated in the office or the ER.

## 2025-07-22 NOTE — PROGRESS NOTES
"Chief Complaint  Vomiting, Nausea (Everything makes patient nauseous. ), and heavy breathing (Told breathing heavily in sleep. Never tested for sleep apnea. )    Subjective          Laina Jones presents to [facility]    Vomiting, diarrhea- symptoms began about a week ago.  She has had some body aches, worse on the left side.  She has had some wheezing as well.  Patient was evaluated initially UC with Payam Marks DO (07/19/2025) and treated with zofran and bactrim for concerns of bacterial infection.  No imaging or stool studies were performed at that time.  Her symptoms are continuing at this time. Patient states that initially she had diarrhea but that improved after a few days and now she has not had a bowel movement in atleast 3-5 days.  She is having generalized abdominal pain at this time.  No fevers.  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       Objective   Vital Signs:   /72 (BP Location: Left arm, Patient Position: Sitting, Cuff Size: Adult)   Pulse 76   Temp 97.9 °F (36.6 °C) (Temporal)   Resp 16   Ht 165.6 cm (65.2\")   Wt 94.4 kg (208 lb 3.2 oz)   SpO2 96%   BMI 34.44 kg/m²     Physical Exam  Vitals reviewed.   Constitutional:       Appearance: Normal appearance. She is well-developed.   HENT:      Head: Normocephalic and atraumatic.      Right Ear: Tympanic membrane, ear canal and external ear normal.      Left Ear: Tympanic membrane, ear canal and external ear normal.      Ears:      Comments: Mucoid effusions bilaterally     Nose: Nose normal.      Mouth/Throat:      Mouth: Mucous membranes are moist.      Pharynx: No posterior oropharyngeal erythema.   Eyes:      Conjunctiva/sclera: Conjunctivae normal.      Pupils: Pupils are equal, round, and reactive to light.   Cardiovascular:      Rate and Rhythm: Normal rate and regular rhythm.      Heart sounds: No murmur heard.     No " friction rub. No gallop.   Pulmonary:      Effort: Pulmonary effort is normal.      Breath sounds: Normal breath sounds. No wheezing or rhonchi.   Abdominal:      Tenderness: There is abdominal tenderness (LLQ).   Musculoskeletal:      Cervical back: Normal range of motion.   Lymphadenopathy:      Cervical: No cervical adenopathy.   Skin:     General: Skin is warm and dry.   Neurological:      Mental Status: She is alert and oriented to person, place, and time.      Cranial Nerves: No cranial nerve deficit.   Psychiatric:         Mood and Affect: Mood and affect normal.         Behavior: Behavior normal.         Thought Content: Thought content normal.         Judgment: Judgment normal.        Result Review :          Procedures      Assessment and Plan    Diagnoses and all orders for this visit:    1. Nausea and vomiting, unspecified vomiting type (Primary)  Assessment & Plan:  Most likely viral etiology.  Would expect symptoms to be self limiting within the next 24-48 hours.      Orders:  -     POC Pregnancy, Urine  -     POCT SARS-CoV-2 Antigen DESTIN + Flu  -     POCT rapid strep A  -     XR Abdomen KUB (In Office)  -     Enteric Bacterial Panel - Stool, Per Rectum  -     Enteric Parasite Panel - Stool, Per Rectum  -     Clostridioides difficile Toxin, PCR - Stool, Per Rectum    2. Generalized abdominal pain  Assessment & Plan:  Concern that some abdominal pain is secondary to constipation.  Will order KUB to monitor for stool burden and concerns for obstruction.  Miralax cleanout information given to patient.  If she is still having symptoms 1-2 days after cleanout, would recommend stool studies to evaluate for other etiologies.  Also discussed getting CBC, CMP, TSH, Lipase, and Alpha-gal if symptoms persist over the next few days.  If patient develops new or worsening symptoms she needs to be re-evaluated in the office or the ER.       3. Diarrhea, unspecified type  -     Enteric Bacterial Panel - Stool, Per  Rectum  -     Enteric Parasite Panel - Stool, Per Rectum  -     Clostridioides difficile Toxin, PCR - Stool, Per Rectum    4. Constipation, unspecified constipation type  -     XR Abdomen KUB (In Office)              Follow Up   No follow-ups on file.  Patient was given instructions and counseling regarding her condition or for health maintenance advice. Please see specific information pulled into the AVS if appropriate.

## 2025-07-22 NOTE — ASSESSMENT & PLAN NOTE
Most likely viral etiology.  Would expect symptoms to be self limiting within the next 24-48 hours.

## 2025-08-11 ENCOUNTER — TRANSCRIBE ORDERS (OUTPATIENT)
Dept: ADMINISTRATIVE | Facility: HOSPITAL | Age: 25
End: 2025-08-11
Payer: COMMERCIAL

## 2025-08-11 ENCOUNTER — LAB (OUTPATIENT)
Facility: HOSPITAL | Age: 25
End: 2025-08-11
Payer: COMMERCIAL

## 2025-08-11 DIAGNOSIS — E22.1 IDIOPATHIC HYPERPROLACTINEMIA: Primary | ICD-10-CM

## 2025-08-11 DIAGNOSIS — E22.1 IDIOPATHIC HYPERPROLACTINEMIA: ICD-10-CM

## 2025-08-11 LAB — PROLACTIN SERPL-MCNC: 7.77 NG/ML (ref 4.79–23.3)

## 2025-08-11 PROCEDURE — 84146 ASSAY OF PROLACTIN: CPT

## 2025-08-11 PROCEDURE — 36415 COLL VENOUS BLD VENIPUNCTURE: CPT

## 2025-08-27 ENCOUNTER — OFFICE VISIT (OUTPATIENT)
Dept: INTERNAL MEDICINE | Facility: CLINIC | Age: 25
End: 2025-08-27
Payer: COMMERCIAL

## 2025-08-27 VITALS
WEIGHT: 219.2 LBS | SYSTOLIC BLOOD PRESSURE: 122 MMHG | TEMPERATURE: 97.3 F | RESPIRATION RATE: 16 BRPM | HEIGHT: 65 IN | BODY MASS INDEX: 36.52 KG/M2 | DIASTOLIC BLOOD PRESSURE: 78 MMHG

## 2025-08-27 DIAGNOSIS — N97.9 FEMALE FERTILITY PROBLEM: ICD-10-CM

## 2025-08-27 DIAGNOSIS — R06.02 SHORTNESS OF BREATH: Primary | ICD-10-CM

## 2025-08-27 DIAGNOSIS — F31.81 BIPOLAR 2 DISORDER: ICD-10-CM

## 2025-08-27 DIAGNOSIS — R07.9 CHEST PAIN, UNSPECIFIED TYPE: ICD-10-CM

## 2025-08-27 DIAGNOSIS — Q89.8 INFANTILE MYOFIBROMATOSIS: ICD-10-CM

## 2025-08-27 DIAGNOSIS — E66.3 OVERWEIGHT WITH BODY MASS INDEX (BMI) OF 25 TO 25.9 IN ADULT: ICD-10-CM

## 2025-08-27 LAB
ALBUMIN SERPL-MCNC: 4.4 G/DL (ref 3.5–5.2)
ALBUMIN/GLOB SERPL: 1.6 G/DL
ALP SERPL-CCNC: 69 U/L (ref 39–117)
ALT SERPL W P-5'-P-CCNC: 14 U/L (ref 1–33)
ANION GAP SERPL CALCULATED.3IONS-SCNC: 10 MMOL/L (ref 5–15)
AST SERPL-CCNC: 23 U/L (ref 1–32)
BASOPHILS # BLD AUTO: 0.02 10*3/MM3 (ref 0–0.2)
BASOPHILS NFR BLD AUTO: 0.5 % (ref 0–1.5)
BILIRUB SERPL-MCNC: 0.3 MG/DL (ref 0–1.2)
BUN SERPL-MCNC: 8 MG/DL (ref 6–20)
BUN/CREAT SERPL: 10.4 (ref 7–25)
CALCIUM SPEC-SCNC: 10.1 MG/DL (ref 8.6–10.5)
CHLORIDE SERPL-SCNC: 105 MMOL/L (ref 98–107)
CO2 SERPL-SCNC: 22 MMOL/L (ref 22–29)
CREAT SERPL-MCNC: 0.77 MG/DL (ref 0.57–1)
DEPRECATED RDW RBC AUTO: 40.6 FL (ref 37–54)
EGFRCR SERPLBLD CKD-EPI 2021: 111.3 ML/MIN/1.73
EOSINOPHIL # BLD AUTO: 0.18 10*3/MM3 (ref 0–0.4)
EOSINOPHIL NFR BLD AUTO: 4.4 % (ref 0.3–6.2)
ERYTHROCYTE [DISTWIDTH] IN BLOOD BY AUTOMATED COUNT: 12.5 % (ref 12.3–15.4)
GLOBULIN UR ELPH-MCNC: 2.8 GM/DL
GLUCOSE SERPL-MCNC: 93 MG/DL (ref 65–99)
HCT VFR BLD AUTO: 38.9 % (ref 34–46.6)
HGB BLD-MCNC: 13.2 G/DL (ref 12–15.9)
IMM GRANULOCYTES # BLD AUTO: 0.01 10*3/MM3 (ref 0–0.05)
IMM GRANULOCYTES NFR BLD AUTO: 0.2 % (ref 0–0.5)
LYMPHOCYTES # BLD AUTO: 1 10*3/MM3 (ref 0.7–3.1)
LYMPHOCYTES NFR BLD AUTO: 24.2 % (ref 19.6–45.3)
MCH RBC QN AUTO: 30 PG (ref 26.6–33)
MCHC RBC AUTO-ENTMCNC: 33.9 G/DL (ref 31.5–35.7)
MCV RBC AUTO: 88.4 FL (ref 79–97)
MONOCYTES # BLD AUTO: 0.39 10*3/MM3 (ref 0.1–0.9)
MONOCYTES NFR BLD AUTO: 9.4 % (ref 5–12)
NEUTROPHILS NFR BLD AUTO: 2.53 10*3/MM3 (ref 1.7–7)
NEUTROPHILS NFR BLD AUTO: 61.3 % (ref 42.7–76)
NRBC BLD AUTO-RTO: 0 /100 WBC (ref 0–0.2)
PLATELET # BLD AUTO: 186 10*3/MM3 (ref 140–450)
PMV BLD AUTO: 11.2 FL (ref 6–12)
POTASSIUM SERPL-SCNC: 4.3 MMOL/L (ref 3.5–5.2)
PROT SERPL-MCNC: 7.2 G/DL (ref 6–8.5)
RBC # BLD AUTO: 4.4 10*6/MM3 (ref 3.77–5.28)
SODIUM SERPL-SCNC: 137 MMOL/L (ref 136–145)
WBC NRBC COR # BLD AUTO: 4.13 10*3/MM3 (ref 3.4–10.8)

## 2025-08-27 PROCEDURE — 85025 COMPLETE CBC W/AUTO DIFF WBC: CPT | Performed by: NURSE PRACTITIONER

## 2025-08-27 PROCEDURE — 80053 COMPREHEN METABOLIC PANEL: CPT | Performed by: NURSE PRACTITIONER

## 2025-08-27 RX ORDER — QUETIAPINE FUMARATE 300 MG/1
300 TABLET, FILM COATED ORAL NIGHTLY
COMMUNITY